# Patient Record
Sex: FEMALE | Race: WHITE | Employment: FULL TIME | URBAN - METROPOLITAN AREA
[De-identification: names, ages, dates, MRNs, and addresses within clinical notes are randomized per-mention and may not be internally consistent; named-entity substitution may affect disease eponyms.]

---

## 2017-01-18 ENCOUNTER — THERAPY VISIT (OUTPATIENT)
Dept: PHYSICAL THERAPY | Facility: CLINIC | Age: 57
End: 2017-01-18
Payer: COMMERCIAL

## 2017-01-18 DIAGNOSIS — Z12.31 VISIT FOR SCREENING MAMMOGRAM: ICD-10-CM

## 2017-01-18 DIAGNOSIS — M54.2 CERVICALGIA: ICD-10-CM

## 2017-01-18 DIAGNOSIS — M25.512 LEFT SHOULDER PAIN: Primary | ICD-10-CM

## 2017-01-18 PROCEDURE — G0202 SCR MAMMO BI INCL CAD: HCPCS

## 2017-01-18 PROCEDURE — 97161 PT EVAL LOW COMPLEX 20 MIN: CPT | Mod: GP | Performed by: PHYSICAL THERAPIST

## 2017-01-18 PROCEDURE — 97110 THERAPEUTIC EXERCISES: CPT | Mod: GP | Performed by: PHYSICAL THERAPIST

## 2017-01-18 NOTE — PROGRESS NOTES
Subjective:    Camelia Alvarez is a 56 year old female with a left shoulder condition.        Patient relates that she is feeling L shoulder shooting pain with use of the L UE.  Notes that her symptoms have been present since March/April 2016, without apparent reason for onset.  Notes that her symptoms have been better with seeing a chiropractor who has taught her how to work on the trigger points of the L posterior shoulder..    Patient reports pain:  Upper arm (anterior and posterior arm when symptoms are present.).  Radiates to: no radiation.  Pain is described as sharp, shooting and aching and is intermittent   Associated symptoms:  Loss of motion/stiffness, loss of strength and painful arc. Pain is worse during the day.  Symptoms are exacerbated by carrying, using arm behind back, lifting, lying on extremity, using arm overhead and using arm at shoulder level (patient is a L side sleepier and sleeping is better since seeing the chiropractor.  Ache in the morning after sleeping on the shoulder.) and relieved by rest.  Since onset symptoms are unchanged.  Special testing: none.  Previous treatment: for the R shoulder and neck last year.    General health as reported by patient is good.  Pertinent medical history includes:  High blood pressure (high cholesterol).  Medical allergies: yes (Penicillins).  Surgical history: appendix.  Medication history: for HTN and cholesterol.  Current occupation is   .  Patient is working in normal job without restrictions.  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    Barriers include:  None as reported by the patient.    Red flags:  None as reported by the patient.                      Objective:    System              Cervical/Thoracic Evaluation  Arom wnl cervical: Rot R 70, L 72, SB R 21, L 21 deg, flexion WNL, Ext 60 deg.                                  Shoulder Evaluation:  ROM:  AROM:    Flexion:  Left:  Limited and  painful    Right:  Full  Extension: Left: 55 Right: full  Abduction:  Left: end range loss   Right:  Full      External Rotation:  Left:  35    Right:  80          Flexion/External Rotation:  Left:  T2    Right:  T3  Extension/Internal Rotation:  Left:  Mid sacraum    Right:  T4      Pain: pain limits shoulder motion both actively and passively    Strength:    Flexion: Left:4+/5    Pain: -    Right: 3+/5      Pain:  -/+    Abduction:  Left: 3+/5   Pain:++    Right: 4+/5      Pain:-    Internal Rotation:  Left:4+/5      Pain:+    Right: 5/5      Pain:-  External Rotation:   Left:3/5      Pain:++   Right:4+/5      Pain:-        Elbow Flexion:  Left:5/5      Pain:-/+    Right:5/5      Pain:-  Elbow Extension:  Left:5/5      Pain:-    Right:5/5      Pain:-                                           Edie Cervical Evaluation        Test Movements:        RET EXT: During: no effect  After: no effect    Repeat RET EXT: During: no effect  After: no effect  Mechanical Response: IncROM                                                                   ROS    Assessment/Plan:      Patient is a 56 year old female with left side shoulder complaints.    Patient has the following significant findings with corresponding treatment plan.                Diagnosis 1:  L shoulder pain    Pain -  manual therapy, self management, education, directional preference exercise, home program - for neck and shoulder  Decreased ROM/flexibility - manual therapy, therapeutic exercise and home program  Decreased joint mobility - manual therapy, therapeutic exercise and home program  Decreased strength - therapeutic exercise, therapeutic activities and home program  Impaired muscle performance - neuro re-education and home program  Decreased function - therapeutic activities and home program  Impaired posture - neuro re-education, therapeutic activities and home program    Therapy Evaluation Codes:   1) History comprised of:   Personal factors that  impact the plan of care:      None.    Comorbidity factors that impact the plan of care are:      None.     Medications impacting care: None.  2) Examination of Body Systems comprised of:   Body structures and functions that impact the plan of care:      Cervical spine and Shoulder.   Activity limitations that impact the plan of care are:      Bathing, Dressing and Lifting.  3) Clinical presentation characteristics are:   Stable/Uncomplicated.  4) Decision-Making    Low complexity using standardized patient assessment instrument and/or measureable assessment of functional outcome.  Cumulative Therapy Evaluation is: Low complexity.    Previous and current functional limitations:  (See Goal Flow Sheet for this information)    Short term and Long term goals: (See Goal Flow Sheet for this information)     Communication ability:  Patient appears to be able to clearly communicate and understand verbal and written communication and follow directions correctly.  Treatment Explanation - The following has been discussed with the patient:   RX ordered/plan of care  Anticipated outcomes  Possible risks and side effects  This patient would benefit from PT intervention to resume normal activities.   Rehab potential is good.  Patient had improved pain with movement and improved shoulder ROM after neck extension exercises.  Pain was produced with shoulder ROM, not worse afterwards.   Possible cervical derangement with shoulder tightness (dysfunction    Frequency:  1 X week, once daily  Duration:  for 6 weeks  Discharge Plan:  Achieve all LTG.  Independent in home treatment program.  Reach maximal therapeutic benefit.    Please refer to the daily flowsheet for treatment today, total treatment time and time spent performing 1:1 timed codes.

## 2017-02-01 ENCOUNTER — THERAPY VISIT (OUTPATIENT)
Dept: PHYSICAL THERAPY | Facility: CLINIC | Age: 57
End: 2017-02-01
Payer: COMMERCIAL

## 2017-02-01 DIAGNOSIS — M25.512 ACUTE PAIN OF LEFT SHOULDER: Primary | ICD-10-CM

## 2017-02-01 DIAGNOSIS — M54.2 CERVICALGIA: ICD-10-CM

## 2017-02-01 PROCEDURE — 97110 THERAPEUTIC EXERCISES: CPT | Mod: GP | Performed by: PHYSICAL THERAPIST

## 2017-05-24 ENCOUNTER — OFFICE VISIT (OUTPATIENT)
Dept: PEDIATRICS | Facility: CLINIC | Age: 57
End: 2017-05-24
Payer: COMMERCIAL

## 2017-05-24 VITALS
DIASTOLIC BLOOD PRESSURE: 70 MMHG | TEMPERATURE: 97.5 F | HEART RATE: 56 BPM | OXYGEN SATURATION: 100 % | WEIGHT: 185.4 LBS | BODY MASS INDEX: 25.68 KG/M2 | SYSTOLIC BLOOD PRESSURE: 126 MMHG

## 2017-05-24 DIAGNOSIS — M26.609 TMJ (TEMPOROMANDIBULAR JOINT SYNDROME): Primary | ICD-10-CM

## 2017-05-24 DIAGNOSIS — R68.84 JAW PAIN: ICD-10-CM

## 2017-05-24 PROCEDURE — 99213 OFFICE O/P EST LOW 20 MIN: CPT | Performed by: FAMILY MEDICINE

## 2017-05-24 RX ORDER — CYCLOBENZAPRINE HCL 5 MG
5-10 TABLET ORAL
Qty: 60 TABLET | Refills: 0 | Status: SHIPPED | OUTPATIENT
Start: 2017-05-24 | End: 2018-01-03

## 2017-05-24 ASSESSMENT — PAIN SCALES - GENERAL: PAINLEVEL: MODERATE PAIN (4)

## 2017-05-24 NOTE — MR AVS SNAPSHOT
After Visit Summary   5/24/2017    Camelia Alvarez    MRN: [id number]           Patient Information     Date Of Birth          1960        Visit Information        Provider Department      5/24/2017 4:40 PM Marta Calvin MD [facility]        Today's Diagnoses     TMJ (temporomandibular joint syndrome)    -  1    Jaw pain, right          Care Instructions      TMJ Syndrome  The temporomandibular joint (TMJ) is the joint that connects your lower jaw to your head. You can feel it in front of your ears when you open and close your mouth. TMJ disorders involve chronic or recurrent pain in the joint. When treated, symptoms of TMJ disorders usually go away within a few months.  Causes  There is no widely agreed-on cause of TMJ disorders. They have been linked to injury, arthritis, chronic fatigue syndrome, and fibromyalgia. A definite connection has not been shown, though.  Symptoms    Pain in the face, jaw, or neck    Pain with jaw movement or chewing    Locking or catching sensation of the jaw    Clicking, popping, or grinding sounds with movement of the TMJ    Headache    Ear pain  Home care  Modest, nonsurgical treatments are a good first step toward relieving symptoms. Try the approaches described below.    Rest the jaw by avoiding crunchy or hard-to-chew foods. Do not eat hard or sticky candies. Soft foods and liquids are easier on the jaw.    Protect your jaw while yawning. If you need to yawn, put your fist under your chin to prevent your mouth from opening up too wide.    To help relieve pain, try applying hot or cold packs to the painful area. Try both hot and cold to find out which works best for you. If you use hot packs (small towels soaked in hot water), be careful not to burn yourself.    You may take acetaminophen or ibuprofen for pain, unless you were given a different pain medicine. (Note: If you have chronic liver or kidney disease or have ever had a stomach  ulcer or gastrointestinal bleeding, talk with your healthcare provider before using these medicines. Also talk to your provider if you are taking medicine to prevent blood clots.) Aspirin should never be given to anyone younger than 18 years of age who is ill with a viral infection or fever. It may cause severe liver or brain damage.  Reducing stress  If stress seems to be contributing to your symptoms, try to identify the sources of stress in your life. These aren t always obvious. Common stressors include:    Everyday hassles (which can add up), such as traffic jams, missed appointments, or car trouble    Major life changes, both good (such as a new baby or job promotion) and bad (loss of job or loss of a loved one)    Overload: The feeling that you have too many responsibilities and can't take care of everything at once    Helplessness: Feeling like your problems are more than you can solve  When possible, do something about your sources of stress. See if you can avoid hassles, limit the amount of change in your life at one time, and take breaks when you feel overloaded.  Unfortunately, many stressful situations cannot be avoided. So learning how to manage stress better is very important. Getting regular exercise, eating nutritious, balanced meals, and getting adequate rest all help to make everyday stress more manageable. Certain techniques are also helpful: relaxation and breathing exercises, visualization, biofeedback, meditation, or simply taking some time out to clear your mind. For more information, talk with your healthcare provider.  Follow-up care  Follow up with your healthcare provider, or as advised. Further testing and additional treatment may be required. If changes to your lifestyle do not improve your symptoms, talk with your healthcare provider about other available therapies. These include bite guards for help with teeth grinding, stress management techniques, and more. If stress is an important  factor and does not respond to the above simple measures, talk to your doctor about a referral for stress management.    If X-rays were done, they will be reviewed by a specialist. You will be notified of the results, especially if they affect treatment.  Call 911  Call emergency services right away if any of these occur:    Trouble breathing or swallowing, wheezing    Confusion    Extreme drowsiness or trouble awakening    Fainting or loss of consciousness    Rapid heart rate  When to seek medical advice  Call your healthcare provider right away if any of these occur:    Your face becomes swollen or red.    Your pain worsens.    You have increasing neck, mouth, tooth, or throat pain.    You develop a fever of 100.4F (38 C) or higher    3421-8574 [employer]. [address] [place] [address], [place] [address]. All rights reserved. This information is not intended as a substitute for professional medical care. Always follow your healthcare professional's instructions.        Helping Your Temporomandibular Joint (TMJ) Heal  The temporomandibular joint (TMJ) is a ball-and-socket joint located where the upper and lower jaws meet. When the TMJ and related muscles are injured, they need time to heal. Self-care is very important. You can take steps to reduce pressure on the TMJ and speed healing.    Eating with care  Chewing strains the TMJ. When symptoms are bad, you may not be able to chew at all. To get you through times when your symptoms are at their worst, try these tips:    Choose soft foods, like scrambled eggs, oatmeal, yogurt, quiche, tofu, soup, smoothies, pasta, fish, mashed potatoes, milkshakes, bananas, applesauce, gelatin, or ice cream.    Avoid biting into hard foods, like whole apples, carrots, and corn on the cob. Instead, cut foods into bite-sized pieces.    Grind or finely chop meats and other tough foods. Try hamburger meat instead of steak.  Using ice and heat  Your health care provider may  suggest using ice and heat. Ice helps reduce swelling and pain. Heat helps relax muscles, increasing blood flow.    Use a gel pack or ice wrapped in a towel for severe pain. Apply for 10 to 20 minutes. Repeat as needed.    Use moist heat for mild to moderate muscle pain. Apply a moist, warm towel to the muscles for 10 to 20 minutes. Repeat as needed.  Avoiding triggers  Certain activities (called triggers) strain the TMJ, making symptoms worse. The tips below can help you avoid common triggers and limit strain.    Avoid hard or chewy foods, like nuts, pretzels, popcorn, chips, gum, caramel, gummy candies, carrots, whole apples, hard breads, and even ice.    Reschedule routine dental visits, like cleanings, if your jaw aches. If you have severe pain, call your health care provider.    Support your jaw when yawning. When you feel a yawn coming on, put a fist under your jaw. Apply gentle pressure. This helps prevent wide, painful yawns.    Avoid any activity that hurts, like nail biting, yelling, and singing.  Maintaining good posture  Work at improving your posture during the day and when you sleep. Good posture can help your body heal. Try these tips:    Use a headset when on the telephone. Don t cradle the phone with your shoulder.    Keep ergonomics in mind. This includes making sure your workstation fits your body. Support your lower back. Take frequent breaks to stretch and rest. If you use a computer, keep the monitor at eye level.    Keep your head in a neutral position, with your ears in line with your shoulders. Don t slouch or crane your head forward.    Use an orthopaedic pillow to support your head and neck during sleep.    0803-1839 [employer]. [address] [place] [address], [place] [address]. All rights reserved. This information is not intended as a substitute for professional medical care. Always follow your healthcare professional's instructions.        Pain Relief Methods for  Temporomandibular Disorders (TMD)  You have been diagnosed with temporomandibular disorder (TMD). This term describes a group of problems related to the temporomandibular joint (TMJ)and nearby muscles. The TMJ is located where the upper and lower jaws meet. TMD can cause painful and frustrating symptoms. But your health care provider can recommend various pain relief methods as part of your treatment. These may include medicines and certain types of therapy, like massage or gentle exercise.  Using medicines    Medicines may be prescribed to treat TMD. Others may be available over-the-counter. The medicine type and dosage will depend on the problem you have. For your safety, tell your health care provider if you are currently taking any medicines. Also mention any herbs or supplements you are using. Common medicines used to treat TMD include:    Anti-inflammatories and analgesics. These treat pain, inflammation, osteoarthritis, and rheumatoid arthritis. Anti-inflammatories reduce swelling, heat, redness, and pain. They also help restore function. Analgesics reduce pain. Nonsteroidal anti-inflammatories (NSAIDs) relieve inflammation as well as pain.    Muscle relaxants. These treat myofascial pain. This is pain that occurs in the soft tissues or muscles around the TMJ. Muscle relaxants help ease muscle tension. This reduces pressure on the TMJ from tight jaw muscles.    Antidepressants. These can be used to reduce pain or bruxism (teeth grinding). At higher dosages, these medicines are used to treat depression. Given at low dosages, antidepressants help relieve TMD symptoms. They can reduce muscle pain. They also raise the level of serotonin, a body chemical that improves sleep. This in turn can decrease bruxism during the night.  Treating painful muscles  A trigger point is a painful spot in a tight muscle. It is often painful to the touch and may refer pain to other places. Your health care provider can focus on  trigger points using:    Massage, both inside and outside the mouth. This relaxes muscles and improves circulation.    Palpation, which is applying pressure to points of the jaw and face with the fingers.    Cold spray and stretching of the muscles to relax them.    An anesthetic for pain relief. This may be given as an injection by your dentist.  Treating the joint  Therapy may focus directly on the TMJ. There are different ways to treat the joint:    A self-care program helps you treat and manage symptoms on your own. Your program may include exercises. It may also include using ice and heat to relieve pain.    Gentle manipulation reduces pain and restores range of motion. The health care provider uses his or her hands to relax muscles and ligaments around the joint.    Exercises strengthen muscles in the jaw and face.    Ultrasound uses sound waves to reduce pain and swelling. It also improves pain and swelling.  Treating inflammation  When the joint is inflamed, movement becomes difficult -- even impossible at times. Your health care provider can help. Treatment may include:    Rest and gentle exercise to increase range of motion. One common exercise is to apply pressure to the jaw and resist the movement (isometric exercise).    A gel pack or ice wrapped in a towel applied for 10 to 20 minutes repeated 3 or 4 times a day. This eases swelling and reduces pain.    Massage and gentle manipulation as described above.    6371-0897 [employer]. [address] [place] [address], [place] [address]. All rights reserved. This information is not intended as a substitute for professional medical care. Always follow your healthcare professional's instructions.    Take FLEXERIL 5-10 mg at night as needed for right jaw pain            Follow-ups after your visit        Your next 10 appointments already scheduled     Jun 21, 2017 10:30 AM CDT   New Visit with Fatoumata Bloom LP   [facility]    [address] [address] [phone]            Jul 05, 2017 11:30 AM CDT   Return Visit with Fatoumata Bloom LP   [facility] [place] [facility] [place])    [address] [address] [phone]              Who to contact     If you have questions or need follow up information about today's clinic visit or your schedule please contact [facility] directly at [phone].  Normal or non-critical lab and imaging results will be communicated to you by [employer], letter or phone within 4 business days after the clinic has received the results. If you do not hear from us within 7 days, please contact the clinic through [employer] or phone. If you have a critical or abnormal lab result, we will notify you by phone as soon as possible.  Submit refill requests through [employer] or call your pharmacy and they will forward the refill request to us. Please allow 3 business days for your refill to be completed.          Additional Information About Your Visit        MyChart Information     [employer] gives you secure access to your electronic health record. If you see a primary care provider, you can also send messages to your care team and make appointments. If you have questions, please call your primary care clinic.  If you do not have a primary care provider, please call [phone] and they will assist you.      [employer] is an electronic gateway that provides easy, online access to your medical records. With [employer], you can request a clinic appointment, read your test results, renew a prescription or communicate with your care team.     To access your existing account, please contact your [facility] or call [phone] for assistance.        Care EveryWhere ID     This is your Care EveryWhere ID. This could be used by other organizations to access your [facility] medical records  [id number]         Your Vitals Were     Pulse Temperature Last Period Pulse Oximetry BMI (Body Mass Index)       56 97.5  F (36.4  C) (Oral) 09/01/2014 (Approximate) 100% 25.68 kg/m2        Blood Pressure from Last 3 Encounters:   05/24/17 126/70   11/18/16 128/76   03/15/16 122/68    Weight from Last 3 Encounters:   05/24/17 185 lb 6.4 oz (84.1 kg)   11/18/16 187 lb 8 oz (85 kg)   02/22/16 191 lb 6.4 oz (86.8 kg)              Today, you had the following     No orders found for display         Today's Medication Changes          These changes are accurate as of: 5/24/17  4:51 PM.  If you have any questions, ask your nurse or doctor.               Start taking these medicines.        Dose/Directions    cyclobenzaprine 5 MG tablet   Commonly known as:  FLEXERIL   Used for:  TMJ (temporomandibular joint syndrome), Jaw pain   Started by:  Marta Calvin MD        Dose:  5-10 mg   Take 1-2 tablets (5-10 mg) by mouth nightly as needed for muscle spasms   Quantity:  60 tablet   Refills:  0            Where to get your medicines      These medications were sent to [facility] - [place], [place] - [address], [address]  [address], [address], [facility] [address]     Phone:  [phone]     cyclobenzaprine 5 MG tablet                Primary Care Provider Office Phone # Fax #    Marta Calvin [phone]       [facility] [address]  [facility] [address]        Thank you!     Thank you for choosing [facility]  for your care. Our goal is always to provide you with excellent care. Hearing back from our patients is one way we can continue to improve our services. Please take a few minutes to complete the written survey that you may receive in the mail after your visit with us. Thank you!             Your Updated Medication List - Protect others around you: Learn how to safely use, store and throw away your medicines at www.disposemymeds.org.           This list is accurate as of: 5/24/17  4:51 PM.  Always use your most recent med list.                   Brand Name Dispense Instructions for use    cyclobenzaprine 5 MG tablet    FLEXERIL    60 tablet    Take 1-2 tablets (5-10 mg) by mouth nightly as needed for muscle spasms       DiphenhydrAMINE HCl (Sleep) 25 MG Caps      Take 1-2 tablets by mouth nightly as needed       fluticasone 50 MCG/ACT spray    FLONASE    16 g    Spray 2 sprays into both nostrils daily       lisinopril-hydrochlorothiazide 10-12.5 MG per tablet    PRINZIDE/ZESTORETIC    90 tablet    Take 1 tablet by mouth daily       MULTIVITAMIN TABS   OR     0    1 tablet daily       OMEGA-3 FISH OIL PO      Take 1 capsule by mouth daily       omeprazole 20 MG CR capsule    priLOSEC    90 capsule    TAKE 1 CAPSULE (20 MG) BY MOUTH  30-60 MINUTES BEFORE A MEAL.       simvastatin 10 MG tablet    ZOCOR    90 tablet    Take 1 tablet (10 mg) by mouth At Bedtime       tolterodine 4 MG 24 hr capsule    DETROL LA    30 capsule    Take 1 capsule (4 mg) by mouth daily       VITAMIN D-3 PO      Take 1 tablet by mouth daily Patient unsure of dose

## 2017-05-24 NOTE — PROGRESS NOTES
SUBJECTIVE:                                                    Camelia Alvarez is a 57 year old female who presents to clinic today for the following health issues:      Jaw Pain  Patient with past medical history significant for hypertension, hyperlipidemia is here with concerns of having progressively worsening pain in the right jaw associated with feeling stiff muscles in the right ear for the past 9 months, worse in the past 2 weeks.   Patient states symptoms have started after she had a crown placement at the dentist office    Around that time   She had a history of TMJ in the past  Patient denies use of at night, denies deep grinding at night  Patient noted pain radiating to the  Right temple and right ear when it gets worse  Has history of seasonal allergies and is not sure if she has sinus infection      Duration: 9 months, worsened and constant x1.5 weeks    Description (location/character/radiation): Right jaw    Intensity:  moderate, severe    Accompanying signs and symptoms: radiating into right ear and headache    History (similar episodes/previous evaluation): Onset 9 months ago with crown placement at dentist. Was intermittent until 1.5 weeks ago became worsened and constant. Patient notes history of TMJ about 20 years ago    Precipitating or alleviating factors: None    Therapies tried and outcome: Ibuprofen 800mg - takes the edge off           Problem list and histories reviewed & adjusted, as indicated.  Additional history: as documented    Patient Active Problem List   Diagnosis     Allergic rhinitis due to dust     Hx gestational diabetes     Hypertension goal BP (blood pressure) < 140/90     Hyperlipidemia LDL goal <160     ASCUS favor benign     Nail dystrophy-right toe     Urge incontinence of urine     Left shoulder pain     Cervicalgia     Past Surgical History:   Procedure Laterality Date     C APPENDECTOMY  1999     COLONOSCOPY  2011    NL, repeat in 10 years     COLONOSCOPY  03/2015     [facility] gastro     HC CURETTAGE, POSTPARTUM       TONSILLECTOMY         Social History   Substance Use Topics     Smoking status: Never Smoker     Smokeless tobacco: Never Used     Alcohol use No     Family History   Problem Relation Age of Onset     DIABETES Paternal Grandmother      type 2     Hypertension Mother      Hypertension Father      OSTEOPOROSIS Maternal Grandmother      Arthritis Maternal Grandmother      MIKEAKHALIDA. Father       of MI at age 81         Current Outpatient Prescriptions   Medication Sig Dispense Refill     cyclobenzaprine (FLEXERIL) 5 MG tablet Take 1-2 tablets (5-10 mg) by mouth nightly as needed for muscle spasms 60 tablet 0     fluticasone (FLONASE) 50 MCG/ACT nasal spray Spray 2 sprays into both nostrils daily 16 g 2     lisinopril-hydrochlorothiazide (PRINZIDE,ZESTORETIC) 10-12.5 MG per tablet Take 1 tablet by mouth daily 90 tablet 1     simvastatin (ZOCOR) 10 MG tablet Take 1 tablet (10 mg) by mouth At Bedtime 90 tablet 3     DiphenhydrAMINE HCl, Sleep, 25 MG CAPS Take 1-2 tablets by mouth nightly as needed        MULTIVITAMIN TABS   OR 1 tablet daily 0 0     tolterodine (DETROL LA) 4 MG 24 hr capsule Take 1 capsule (4 mg) by mouth daily (Patient not taking: Reported on 2017) 30 capsule 1     omeprazole (PRILOSEC) 20 MG capsule TAKE 1 CAPSULE (20 MG) BY MOUTH  30-60 MINUTES BEFORE A MEAL. (Patient not taking: Reported on 2017) 90 capsule 3     Cholecalciferol (VITAMIN D-3 PO) Take 1 tablet by mouth daily Patient unsure of dose       Omega-3 Fatty Acids (OMEGA-3 FISH OIL PO) Take 1 capsule by mouth daily        Allergies   Allergen Reactions     Penicillins Other (See Comments)     Reaction as a child-unsure of type of reaction     Recent Labs   Lab Test  16   0754  11/13/15   0821  03/06/15   1533  14   0937   LDL  95  79   --   104   HDL  54  67   --   60   TRIG  102  91   --   97   ALT  42  36   --   37   CR  0.86  0.86   --   0.91   GFRESTIMATED  69  68   --    64   GFRESTBLACK  83  83   --   78   POTASSIUM  3.9  4.3   --   3.5   TSH  2.02   --   1.12   --       BP Readings from Last 3 Encounters:   05/24/17 126/70   11/18/16 128/76   03/15/16 122/68    Wt Readings from Last 3 Encounters:   05/24/17 185 lb 6.4 oz (84.1 kg)   11/18/16 187 lb 8 oz (85 kg)   02/22/16 191 lb 6.4 oz (86.8 kg)                  Labs reviewed in EPIC    Reviewed and updated as needed this visit by clinical staff  Tobacco  Allergies  Meds  Med Hx  Surg Hx  Fam Hx  Soc Hx      Reviewed and updated as needed this visit by Provider         ROS:  C: NEGATIVE for fever, chills, change in weight  INTEGUMENTARY/SKIN: NEGATIVE for worrisome rashes, moles or lesions  ENT/MOUTH: as above  R: NEGATIVE for significant cough or SOB  CV: NEGATIVE for chest pain, palpitations or peripheral edema  CV: HTN  MUSCULOSKELETAL: as above  PSYCHIATRIC: NEGATIVE for changes in mood or affect    OBJECTIVE:                                                    /70  Pulse 56  Temp 97.5  F (36.4  C) (Oral)  Wt 185 lb 6.4 oz (84.1 kg)  LMP 09/01/2014 (Approximate)  SpO2 100%  BMI 25.68 kg/m2  Body mass index is 25.68 kg/(m^2).  GENERAL: healthy, alert and no distress  EYES: Eyes grossly normal to inspection  HENT: ear canals and TM's normal, nose and mouth without ulcers or lesions  NECK: no adenopathy, no asymmetry, masses, or scars and thyroid normal to palpation  RESP: lungs clear to auscultation - no rales, rhonchi or wheezes  CV: regular rate and rhythm, normal S1 S2, no S3 or S4, no murmur, click or rub, no peripheral edema and peripheral pulses strong  MS: Moderate tenderness over the right TMJ, minimal audible click on the right TMJ, mouth opening-normal but with pain  SKIN: no suspicious lesions or rashes  PSYCH: mentation appears normal, affect normal/bright    Diagnostic Test Results:  none      ASSESSMENT/PLAN:                                                            1. TMJ (temporomandibular joint  syndrome)  Likely causing his current symptoms   patient handouts given. Recommended warm packs to TMJ areas, avoid chewing, biting hard foods, grinding teeth. Encouraged to use mouth guards .   Will consider referral to TMJ clinic for persistent or for worsening concerns.  Per patient's report, her dentist took her right jaw x-ray which showed Some changes and ultimately patient was referred to orthodontist office  Dosing and potential medication side effects discussed.      - cyclobenzaprine (FLEXERIL) 5 MG tablet; Take 1-2 tablets (5-10 mg) by mouth nightly as needed for muscle spasms  Dispense: 60 tablet; Refill: 0    2. Jaw pain, right  ddx-From recent dental work/TMJ  - cyclobenzaprine (FLEXERIL) 5 MG tablet; Take 1-2 tablets (5-10 mg) by mouth nightly as needed for muscle spasms  Dispense: 60 tablet; Refill: 0    Chart documentation done in part with Dragon Voice recognition Software. Although reviewed after completion, some word and grammatical error may remain.    See Patient Instructions    Marta Calvin MD  [facility]

## 2017-05-24 NOTE — PATIENT INSTRUCTIONS
TMJ Syndrome  The temporomandibular joint (TMJ) is the joint that connects your lower jaw to your head. You can feel it in front of your ears when you open and close your mouth. TMJ disorders involve chronic or recurrent pain in the joint. When treated, symptoms of TMJ disorders usually go away within a few months.  Causes  There is no widely agreed-on cause of TMJ disorders. They have been linked to injury, arthritis, chronic fatigue syndrome, and fibromyalgia. A definite connection has not been shown, though.  Symptoms    Pain in the face, jaw, or neck    Pain with jaw movement or chewing    Locking or catching sensation of the jaw    Clicking, popping, or grinding sounds with movement of the TMJ    Headache    Ear pain  Home care  Modest, nonsurgical treatments are a good first step toward relieving symptoms. Try the approaches described below.    Rest the jaw by avoiding crunchy or hard-to-chew foods. Do not eat hard or sticky candies. Soft foods and liquids are easier on the jaw.    Protect your jaw while yawning. If you need to yawn, put your fist under your chin to prevent your mouth from opening up too wide.    To help relieve pain, try applying hot or cold packs to the painful area. Try both hot and cold to find out which works best for you. If you use hot packs (small towels soaked in hot water), be careful not to burn yourself.    You may take acetaminophen or ibuprofen for pain, unless you were given a different pain medicine. (Note: If you have chronic liver or kidney disease or have ever had a stomach ulcer or gastrointestinal bleeding, talk with your healthcare provider before using these medicines. Also talk to your provider if you are taking medicine to prevent blood clots.) Aspirin should never be given to anyone younger than 18 years of age who is ill with a viral infection or fever. It may cause severe liver or brain damage.  Reducing stress  If stress seems to be contributing to your symptoms,  try to identify the sources of stress in your life. These aren t always obvious. Common stressors include:    Everyday hassles (which can add up), such as traffic jams, missed appointments, or car trouble    Major life changes, both good (such as a new baby or job promotion) and bad (loss of job or loss of a loved one)    Overload: The feeling that you have too many responsibilities and can't take care of everything at once    Helplessness: Feeling like your problems are more than you can solve  When possible, do something about your sources of stress. See if you can avoid hassles, limit the amount of change in your life at one time, and take breaks when you feel overloaded.  Unfortunately, many stressful situations cannot be avoided. So learning how to manage stress better is very important. Getting regular exercise, eating nutritious, balanced meals, and getting adequate rest all help to make everyday stress more manageable. Certain techniques are also helpful: relaxation and breathing exercises, visualization, biofeedback, meditation, or simply taking some time out to clear your mind. For more information, talk with your healthcare provider.  Follow-up care  Follow up with your healthcare provider, or as advised. Further testing and additional treatment may be required. If changes to your lifestyle do not improve your symptoms, talk with your healthcare provider about other available therapies. These include bite guards for help with teeth grinding, stress management techniques, and more. If stress is an important factor and does not respond to the above simple measures, talk to your doctor about a referral for stress management.    If X-rays were done, they will be reviewed by a specialist. You will be notified of the results, especially if they affect treatment.  Call 911  Call emergency services right away if any of these occur:    Trouble breathing or swallowing, wheezing    Confusion    Extreme drowsiness or  trouble awakening    Fainting or loss of consciousness    Rapid heart rate  When to seek medical advice  Call your healthcare provider right away if any of these occur:    Your face becomes swollen or red.    Your pain worsens.    You have increasing neck, mouth, tooth, or throat pain.    You develop a fever of 100.4F (38 C) or higher    3398-4729 [employer]. [address] [place] [address]. All rights reserved. This information is not intended as a substitute for professional medical care. Always follow your healthcare professional's instructions.        Helping Your Temporomandibular Joint (TMJ) Heal  The temporomandibular joint (TMJ) is a ball-and-socket joint located where the upper and lower jaws meet. When the TMJ and related muscles are injured, they need time to heal. Self-care is very important. You can take steps to reduce pressure on the TMJ and speed healing.    Eating with care  Chewing strains the TMJ. When symptoms are bad, you may not be able to chew at all. To get you through times when your symptoms are at their worst, try these tips:    Choose soft foods, like scrambled eggs, oatmeal, yogurt, quiche, tofu, soup, smoothies, pasta, fish, mashed potatoes, milkshakes, bananas, applesauce, gelatin, or ice cream.    Avoid biting into hard foods, like whole apples, carrots, and corn on the cob. Instead, cut foods into bite-sized pieces.    Grind or finely chop meats and other tough foods. Try hamburger meat instead of steak.  Using ice and heat  Your health care provider may suggest using ice and heat. Ice helps reduce swelling and pain. Heat helps relax muscles, increasing blood flow.    Use a gel pack or ice wrapped in a towel for severe pain. Apply for 10 to 20 minutes. Repeat as needed.    Use moist heat for mild to moderate muscle pain. Apply a moist, warm towel to the muscles for 10 to 20 minutes. Repeat as needed.  Avoiding triggers  Certain activities (called triggers)  strain the TMJ, making symptoms worse. The tips below can help you avoid common triggers and limit strain.    Avoid hard or chewy foods, like nuts, pretzels, popcorn, chips, gum, caramel, gummy candies, carrots, whole apples, hard breads, and even ice.    Reschedule routine dental visits, like cleanings, if your jaw aches. If you have severe pain, call your health care provider.    Support your jaw when yawning. When you feel a yawn coming on, put a fist under your jaw. Apply gentle pressure. This helps prevent wide, painful yawns.    Avoid any activity that hurts, like nail biting, yelling, and singing.  Maintaining good posture  Work at improving your posture during the day and when you sleep. Good posture can help your body heal. Try these tips:    Use a headset when on the telephone. Don t cradle the phone with your shoulder.    Keep ergonomics in mind. This includes making sure your workstation fits your body. Support your lower back. Take frequent breaks to stretch and rest. If you use a computer, keep the monitor at eye level.    Keep your head in a neutral position, with your ears in line with your shoulders. Don t slouch or crane your head forward.    Use an orthopaedic pillow to support your head and neck during sleep.    4002-8774 [employer]. [address] [place] [address], [place] [address]. All rights reserved. This information is not intended as a substitute for professional medical care. Always follow your healthcare professional's instructions.        Pain Relief Methods for Temporomandibular Disorders (TMD)  You have been diagnosed with temporomandibular disorder (TMD). This term describes a group of problems related to the temporomandibular joint (TMJ)and nearby muscles. The TMJ is located where the upper and lower jaws meet. TMD can cause painful and frustrating symptoms. But your health care provider can recommend various pain relief methods as part of your treatment. These may include  medicines and certain types of therapy, like massage or gentle exercise.  Using medicines    Medicines may be prescribed to treat TMD. Others may be available over-the-counter. The medicine type and dosage will depend on the problem you have. For your safety, tell your health care provider if you are currently taking any medicines. Also mention any herbs or supplements you are using. Common medicines used to treat TMD include:    Anti-inflammatories and analgesics. These treat pain, inflammation, osteoarthritis, and rheumatoid arthritis. Anti-inflammatories reduce swelling, heat, redness, and pain. They also help restore function. Analgesics reduce pain. Nonsteroidal anti-inflammatories (NSAIDs) relieve inflammation as well as pain.    Muscle relaxants. These treat myofascial pain. This is pain that occurs in the soft tissues or muscles around the TMJ. Muscle relaxants help ease muscle tension. This reduces pressure on the TMJ from tight jaw muscles.    Antidepressants. These can be used to reduce pain or bruxism (teeth grinding). At higher dosages, these medicines are used to treat depression. Given at low dosages, antidepressants help relieve TMD symptoms. They can reduce muscle pain. They also raise the level of serotonin, a body chemical that improves sleep. This in turn can decrease bruxism during the night.  Treating painful muscles  A trigger point is a painful spot in a tight muscle. It is often painful to the touch and may refer pain to other places. Your health care provider can focus on trigger points using:    Massage, both inside and outside the mouth. This relaxes muscles and improves circulation.    Palpation, which is applying pressure to points of the jaw and face with the fingers.    Cold spray and stretching of the muscles to relax them.    An anesthetic for pain relief. This may be given as an injection by your dentist.  Treating the joint  Therapy may focus directly on the TMJ. There are  different ways to treat the joint:    A self-care program helps you treat and manage symptoms on your own. Your program may include exercises. It may also include using ice and heat to relieve pain.    Gentle manipulation reduces pain and restores range of motion. The health care provider uses his or her hands to relax muscles and ligaments around the joint.    Exercises strengthen muscles in the jaw and face.    Ultrasound uses sound waves to reduce pain and swelling. It also improves pain and swelling.  Treating inflammation  When the joint is inflamed, movement becomes difficult -- even impossible at times. Your health care provider can help. Treatment may include:    Rest and gentle exercise to increase range of motion. One common exercise is to apply pressure to the jaw and resist the movement (isometric exercise).    A gel pack or ice wrapped in a towel applied for 10 to 20 minutes repeated 3 or 4 times a day. This eases swelling and reduces pain.    Massage and gentle manipulation as described above.    2315-7924 [employer]. [address] [place] [address], [place] [address]. All rights reserved. This information is not intended as a substitute for professional medical care. Always follow your healthcare professional's instructions.    Take FLEXERIL 5-10 mg at night as needed for right jaw pain

## 2017-05-24 NOTE — NURSING NOTE
"Chief Complaint   Patient presents with     Jaw Pain       Initial /70  Pulse 56  Temp 97.5  F (36.4  C) (Oral)  Wt 185 lb 6.4 oz (84.1 kg)  LMP 09/01/2014 (Approximate)  SpO2 100%  BMI 25.68 kg/m2 Estimated body mass index is 25.68 kg/(m^2) as calculated from the following:    Height as of 11/18/16: 5' 11.25\" (1.81 m).    Weight as of this encounter: 185 lb 6.4 oz (84.1 kg).  BP completed using cuff size: rahul Oquendo CMA    "

## 2017-05-30 ENCOUNTER — TELEPHONE (OUTPATIENT)
Dept: PEDIATRICS | Facility: CLINIC | Age: 57
End: 2017-05-30

## 2017-05-30 DIAGNOSIS — R68.84 JAW PAIN: Primary | ICD-10-CM

## 2017-05-30 RX ORDER — HYDROCODONE BITARTRATE AND ACETAMINOPHEN 5; 325 MG/1; MG/1
.5-1 TABLET ORAL 2 TIMES DAILY PRN
Qty: 15 TABLET | Refills: 0 | Status: SHIPPED | OUTPATIENT
Start: 2017-05-30 | End: 2018-01-03
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
## 2017-05-30 NOTE — TELEPHONE ENCOUNTER
Received fax from [facility] stating that patients insurance plan does not cover Rx for traMADol (ULTRAM) 50 MG tablet. Patient needs change of medication or prior authorization.     Insurance: [employer]   Phone# [phone]  ID# [id number]    Will route to Dr. Calvin for review and orders.  Ivone Oquendo, [facility]

## 2017-06-07 NOTE — TELEPHONE ENCOUNTER
Rx for HYDROcodone-acetaminophen (NORCO) 5-325 MG per tablet from 5/30/17 never picked up at desk.     Mailed Rx for [facility] pharmacy at  [address] [facility] [address]    Left message on home number to return call to clinic.  If/when patient returns call can inform Rx mailed to her [facility] pharmacy.  Ivone Oquendo, [facility]

## 2017-06-21 ENCOUNTER — OFFICE VISIT (OUTPATIENT)
Dept: PSYCHOLOGY | Facility: CLINIC | Age: 57
End: 2017-06-21
Payer: COMMERCIAL

## 2017-06-21 DIAGNOSIS — F43.22 ADJUSTMENT DISORDER WITH ANXIETY: Primary | ICD-10-CM

## 2017-06-21 PROCEDURE — 90791 PSYCH DIAGNOSTIC EVALUATION: CPT | Performed by: PSYCHOLOGIST
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
## 2017-06-21 NOTE — MR AVS SNAPSHOT
MRN:[id number]                      After Visit Summary   6/21/2017    Camelia Alvarez    MRN: [id number]           Visit Information        Provider Department      6/21/2017 10:30 AM Fatoumata Bloom LP [facility] Generic      Your next 10 appointments already scheduled     Jul 19, 2017  9:30 AM CDT   Return Visit with Fatoumata GALEANO SAMUEL Bloom   [facility] ([facility])    [address] [place] [id number] [phone]              MyChart Information     [employer] gives you secure access to your electronic health record. If you see a primary care provider, you can also send messages to your care team and make appointments. If you have questions, please call your primary care clinic.  If you do not have a primary care provider, please call [phone] and they will assist you.        Care EveryWhere ID     This is your Care EveryWhere ID. This could be used by other organizations to access your [facility] medical records  [id number]        Equal Access to Services     KIM RAYMOND : Hadii aad ku hadasho Somichealali, waaxda luqadaha, qaybta kaalmada adeegyaziggy, emma funes. So [facility] [phone].    ATENCIÓN: Si habla español, tiene a robertson disposición servicios gratuitos de asistencia lingüística. Llame al [phone].    We comply with applicable federal civil rights laws and [place] laws. We do not discriminate on the basis of race, color, national origin, age, disability sex, sexual orientation or gender identity.

## 2017-07-19 ENCOUNTER — OFFICE VISIT (OUTPATIENT)
Dept: PSYCHOLOGY | Facility: CLINIC | Age: 57
End: 2017-07-19
Payer: COMMERCIAL

## 2017-07-19 DIAGNOSIS — F43.22 ADJUSTMENT DISORDER WITH ANXIETY: Primary | ICD-10-CM

## 2017-07-19 PROCEDURE — 90834 PSYTX W PT 45 MINUTES: CPT | Performed by: PSYCHOLOGIST

## 2017-07-19 NOTE — MR AVS SNAPSHOT
MRN:[id number]                      After Visit Summary   7/19/2017    Camelia Alvarez    MRN: [id number]           Visit Information        Provider Department      7/19/2017 9:30 AM Fatoumata Bloom LP [facility] Generic      Your next 10 appointments already scheduled     Aug 16, 2017  9:30 AM CDT   Return Visit with Fatoumata GALEANO SAMUEL Bloom   [facility] ([facility])    [address] [place] [id number] [phone]              MyChart Information     [employer] gives you secure access to your electronic health record. If you see a primary care provider, you can also send messages to your care team and make appointments. If you have questions, please call your primary care clinic.  If you do not have a primary care provider, please call [phone] and they will assist you.        Care EveryWhere ID     This is your Care EveryWhere ID. This could be used by other organizations to access your [facility] medical records  [id number]        Equal Access to Services     KIM RAYMOND : Hadii aad ku hadasho Somichaelali, waaxda luqadaha, qaybta kaalmada adeegyaziggy, emma funes. So [facility] [phone].    ATENCIÓN: Si habla español, tiene a robertson disposición servicios gratuitos de asistencia lingüística. Llame al [phone].    We comply with applicable federal civil rights laws and [place] laws. We do not discriminate on the basis of race, color, national origin, age, disability sex, sexual orientation or gender identity.

## 2017-07-19 NOTE — PROGRESS NOTES
Progress Note    Client Name: Camelia Alvarez  Date: 7/19/2017       Service Type: Individual      Session Start Time: 9:30  Session End Time: 10:20      Session Length:  50 min     Session #: 2     Attendees: Client attended alone    Treatment Plan Last Reviewed: today  PHQ-9 / EUGENE-7: next session     DATA      Progress Since Last Session (Related to Symptoms / Goals / Homework):   Symptoms: Improved    Homework: Partially completed      Episode of Care Goals: Satisfactory progress - ACTION (Actively working towards change); Intervened by reinforcing change plan / affirming steps taken     Current / Ongoing Stressors and Concerns:    is Narcissistic and controlling   Wants to lose weight     Treatment Objective(s) Addressed in This Session:   identify 3 strategies to more effectively address stressors  Increase interest, engagement, and pleasure in doing things  Decrease frequency and intensity of feeling down, depressed, hopeless  Improve concentration, focus, and mindfulness in daily activities   Emotional boundaries and self care     Intervention:   CBT: to not take personally Eliud's opinions  Interpersonal Therapy: Yes, And Communication and boundries  Motivational Interviewing: to lose weight for her value of turning heads.   Recommended support outside of marriage for needs.     ASSESSMENT: Current Emotional / Mental Status (status of significant symptoms):   Risk status (Self / Other harm or suicidal ideation)  Client denies a history of suicidal ideation, suicide attempts, self-injurious behavior, homicidal ideation, homicidal behavior and and other safety concerns   Client denies current fears or concerns for personal safety.   Client denies current or recent suicidal ideation or behaviors.   Client denies current or recent homicidal ideation or behaviors.   Client denies current or recent self injurious behavior or ideation.   Client denies other safety  concerns.   A safety and risk management plan has not been developed at this time, however client was given the after-hours number / 911 should there be a change in any of these risk factors.     Appearance:   Appropriate    Eye Contact:   Fair    Psychomotor Behavior: Normal  Retarded (Slowed)  slumped shoulders    Attitude:   Cooperative  Guarded    Orientation:   All   Speech    Rate / Production: Normal  Slow     Volume:  Soft    Mood:    Anxious  Normal Sad    Affect:    Appropriate  Constricted    Thought Content:  Clear    Thought Form:  Coherent  Logical    Insight:    Good      Medication Review:   No current psychiatric medications prescribed     Medication Compliance:   NA     Changes in Health Issues:   Yes: Weight / dietary issues, Associated Psychological Distress  hypertention, Associated Psychological Distress     Chemical Use Review:   Substance Use: Chemical use reviewed, no active concerns identified      Tobacco Use: No current tobacco use.       Collateral Reports Completed:   Not Applicable    PLAN: (Client Tasks / Therapist Tasks / Other)  Use Yes, and communication. Seek support outside of marriage for self worth and validation. Prioritize her value of turning heads to regulate her eating. Session in a month to check in. Next session last?      Fatoumata Bloom,                                                          ________________________________________________________________________    Treatment Plan    Client's Name: Camelia Alvarez  YOB: 1960    Date: 7/19/2017    DSM-V Diagnoses: Adjustment Disorders  309.24 (F43.22) With anxiety  Psychosocial & Contextual Factors: spouse is controlling and maybe Narcissistic Personality Disorder.  WHODAS: 14 at intake    Referral / Collaboration:  Referral to another professional/service is not indicated at this time..    Anticipated number of session or this episode of care: 4-5      MeasurableTreatment Goal(s) related to diagnosis  / functional impairment(s)  Goal 1: Client will she has established outside support for her controlling marriage    I will know I've met my goal when I have confirmation of what I am doing is correct.      Objective #A (Client Action)    Client will identify 2 strategies to more effectively address stressors  use cognitive strategies identified in therapy to challenge anxious thoughts.  Status: New - Date: 7/19/17     Intervention(s)  Therapist will role-play effective communication skills  teach about healthy boundaries. And calming techniques.    Objective #B  Client will participate in 2 activities to improve mood  initiate 2 social contact(s) per day for increasing lengths of time.  Status: New - Date: 7/19/17     Intervention(s)  Therapist will assign homework to develop and social support system  provide emotional validation.     Client has reviewed and agreed to the above plan.      Fatoumata Bloom, SAMUEL  July 19, 2017

## 2017-07-25 DIAGNOSIS — I10 HYPERTENSION GOAL BP (BLOOD PRESSURE) < 140/90: ICD-10-CM

## 2017-07-25 RX ORDER — LISINOPRIL/HYDROCHLOROTHIAZIDE 10-12.5 MG
TABLET ORAL
Qty: 90 TABLET | Refills: 0 | Status: SHIPPED | OUTPATIENT
Start: 2017-07-25 | End: 2017-11-19

## 2017-07-25 NOTE — TELEPHONE ENCOUNTER
lisinopril-hydrochlorothiazide (PRINZIDE/ZESTORETIC) 10-12.5 MG per tablet   Last Written Prescription Date: 11/18/2016  Last Fill Quantity: 90, # refills: 1  Last Office Visit with G, [facility] or [facility] prescribing provider: 5/24/2017  Next 5 appointments (look out 90 days)     Aug 16, 2017  9:30 AM CDT   Return Visit with Fatoumata Bloom LP   [facility] ([facility])    [address] [place] [id number] [phone]                   Potassium   Date Value Ref Range Status   11/18/2016 3.9 3.4 - 5.3 mmol/L Final     Creatinine   Date Value Ref Range Status   11/18/2016 0.86 0.52 - 1.04 mg/dL Final     BP Readings from Last 3 Encounters:   05/24/17 126/70   11/18/16 128/76   03/15/16 122/68     Refilled per [facility] protocol.    Rhonda Sainz RN

## 2017-08-13 ENCOUNTER — MYC MEDICAL ADVICE (OUTPATIENT)
Dept: PEDIATRICS | Facility: CLINIC | Age: 57
End: 2017-08-13

## 2017-08-13 DIAGNOSIS — M25.511 PAIN IN JOINT OF RIGHT SHOULDER: ICD-10-CM

## 2017-08-13 DIAGNOSIS — M25.511 TRIGGER POINT OF RIGHT SHOULDER REGION: ICD-10-CM

## 2017-08-13 DIAGNOSIS — M54.12 CERVICAL RADICULAR PAIN: ICD-10-CM

## 2017-08-13 DIAGNOSIS — M26.621 ARTHRALGIA OF RIGHT TEMPOROMANDIBULAR JOINT: ICD-10-CM

## 2017-08-14 RX ORDER — TRAMADOL HYDROCHLORIDE 50 MG/1
50 TABLET ORAL EVERY 6 HOURS PRN
Qty: 15 TABLET | Refills: 0 | Status: SHIPPED | OUTPATIENT
Start: 2017-08-14 | End: 2018-01-03

## 2017-08-14 RX ORDER — DICLOFENAC SODIUM 75 MG/1
75 TABLET, DELAYED RELEASE ORAL 2 TIMES DAILY PRN
Qty: 30 TABLET | Refills: 0 | Status: SHIPPED | OUTPATIENT
Start: 2017-08-14 | End: 2018-01-03

## 2017-10-08 DIAGNOSIS — K21.9 GASTROESOPHAGEAL REFLUX DISEASE WITHOUT ESOPHAGITIS: ICD-10-CM

## 2017-10-09 NOTE — TELEPHONE ENCOUNTER
omeprazole (PRILOSEC) 20 MG CR capsule    Last Written Prescription Date: 11/13/2016  Last Fill Quantity: 90,  # refills: 3   Last Office Visit with G, P or [facility] prescribing provider: 5/24/2017    Refilled per [facility] protocol.    Rhonda Sainz RN

## 2017-11-19 DIAGNOSIS — I10 HYPERTENSION GOAL BP (BLOOD PRESSURE) < 140/90: ICD-10-CM

## 2017-11-19 NOTE — LETTER
November 21, 2017      Camelia Alvarez  [address]  [facility] [place] [facility] [address]              Dear Camelia,      We recently received a call from your pharmacy requesting a refill of your medication. We have provided a 30 day refill of your medication, lisinopril-hydrochlorothiazide (PRINZIDE/ZESTORETIC) 10-12.5 MG per tablet, as requested.      A review of your chart indicates that your last laboratory monitoring for the above medication was on 11/15/2016.  Laboratory monitoring is required yearly with your provider.       We have authorized one time 30 day refill of your medication to allow time for you to schedule.     If you have a history of diabetes or high cholesterol, please come in fasting for the appointment. Fasting entails nothing to eat or drink 8 hours prior to your appointment; with the exception on water. You may take your medication the day of the appointment.    Please call the clinic to schedule your appointment.    Thank you for taking an active role in your healthcare.      Sincerely,    [facility]    Marta Calvin MD

## 2017-11-21 RX ORDER — LISINOPRIL/HYDROCHLOROTHIAZIDE 10-12.5 MG
TABLET ORAL
Qty: 30 TABLET | Refills: 0 | Status: SHIPPED | OUTPATIENT
Start: 2017-11-21 | End: 2017-12-25

## 2017-11-21 NOTE — TELEPHONE ENCOUNTER
lisinopril-hydrochlorothiazide (PRINZIDE/ZESTORETIC) 10-12.5 MG per tablet  Last Written Prescription Date: 7/25/2017  Last Fill Quantity: 90, # refills: 0  Last Office Visit with G, P or [facility] prescribing provider: 5/24/2017       Potassium   Date Value Ref Range Status   11/18/2016 3.9 3.4 - 5.3 mmol/L Final     Creatinine   Date Value Ref Range Status   11/18/2016 0.86 0.52 - 1.04 mg/dL Final     BP Readings from Last 3 Encounters:   05/24/17 126/70   11/18/16 128/76   03/15/16 122/68     Ira refill. Letter sent to patient. Rhonda Sainz RN     never used

## 2017-12-18 DIAGNOSIS — I10 HYPERTENSION GOAL BP (BLOOD PRESSURE) < 140/90: ICD-10-CM

## 2017-12-18 DIAGNOSIS — Z00.00 ROUTINE GENERAL MEDICAL EXAMINATION AT A HEALTH CARE FACILITY: Primary | ICD-10-CM

## 2017-12-18 DIAGNOSIS — Z13.29 SCREENING FOR THYROID DISORDER: ICD-10-CM

## 2017-12-18 DIAGNOSIS — Z13.0 SCREENING FOR DEFICIENCY ANEMIA: ICD-10-CM

## 2017-12-18 DIAGNOSIS — E78.5 HYPERLIPIDEMIA LDL GOAL <160: ICD-10-CM

## 2017-12-25 DIAGNOSIS — I10 HYPERTENSION GOAL BP (BLOOD PRESSURE) < 140/90: ICD-10-CM

## 2017-12-26 RX ORDER — LISINOPRIL/HYDROCHLOROTHIAZIDE 10-12.5 MG
TABLET ORAL
Qty: 30 TABLET | Refills: 0 | Status: SHIPPED | OUTPATIENT
Start: 2017-12-26 | End: 2018-01-03

## 2017-12-26 NOTE — TELEPHONE ENCOUNTER
lisinopril-hydrochlorothiazide (PRINZIDE/ZESTORETIC) 10-12.5 MG per tablet    Last Written Prescription Date: 1/21/2017  Last Fill Quantity: 30, # refills: 0  Last Office Visit with [facility], [facility] or [facility] prescribing provider: 5/24/2017  Next 5 appointments (look out 90 days)     Jan 03, 2018  3:30 PM CST   PHYSICAL with Marta Calvin MD   [facility] ([facility])    [address] [place] [id number] [phone]                   Potassium   Date Value Ref Range Status   11/18/2016 3.9 3.4 - 5.3 mmol/L Final     Creatinine   Date Value Ref Range Status   11/18/2016 0.86 0.52 - 1.04 mg/dL Final     BP Readings from Last 3 Encounters:   05/24/17 126/70   11/18/16 128/76   03/15/16 122/68     Refilled per [facility] protocol.    Rhonda Sainz RN

## 2017-12-30 DIAGNOSIS — E78.5 HYPERLIPIDEMIA LDL GOAL <160: ICD-10-CM

## 2018-01-03 ENCOUNTER — OFFICE VISIT (OUTPATIENT)
Dept: PEDIATRICS | Facility: CLINIC | Age: 58
End: 2018-01-03
Payer: COMMERCIAL

## 2018-01-03 ENCOUNTER — RADIANT APPOINTMENT (OUTPATIENT)
Dept: GENERAL RADIOLOGY | Facility: CLINIC | Age: 58
End: 2018-01-03
Attending: FAMILY MEDICINE
Payer: COMMERCIAL

## 2018-01-03 VITALS
SYSTOLIC BLOOD PRESSURE: 126 MMHG | DIASTOLIC BLOOD PRESSURE: 74 MMHG | WEIGHT: 185.5 LBS | OXYGEN SATURATION: 99 % | TEMPERATURE: 97.1 F | BODY MASS INDEX: 25.97 KG/M2 | HEART RATE: 71 BPM | HEIGHT: 71 IN

## 2018-01-03 DIAGNOSIS — Z12.4 SCREENING FOR CERVICAL CANCER: ICD-10-CM

## 2018-01-03 DIAGNOSIS — E78.5 HYPERLIPIDEMIA LDL GOAL <160: ICD-10-CM

## 2018-01-03 DIAGNOSIS — I10 HYPERTENSION GOAL BP (BLOOD PRESSURE) < 140/90: ICD-10-CM

## 2018-01-03 DIAGNOSIS — R20.0 NUMBNESS OF TOES: ICD-10-CM

## 2018-01-03 DIAGNOSIS — Z13.0 SCREENING FOR DEFICIENCY ANEMIA: ICD-10-CM

## 2018-01-03 DIAGNOSIS — E55.9 VITAMIN D DEFICIENCY: ICD-10-CM

## 2018-01-03 DIAGNOSIS — Z13.29 SCREENING FOR THYROID DISORDER: ICD-10-CM

## 2018-01-03 DIAGNOSIS — M79.672 LEFT FOOT PAIN: ICD-10-CM

## 2018-01-03 DIAGNOSIS — Z23 NEED FOR PROPHYLACTIC VACCINATION AND INOCULATION AGAINST INFLUENZA: ICD-10-CM

## 2018-01-03 DIAGNOSIS — Z00.00 ROUTINE GENERAL MEDICAL EXAMINATION AT A HEALTH CARE FACILITY: ICD-10-CM

## 2018-01-03 DIAGNOSIS — Z12.39 BREAST CANCER SCREENING: ICD-10-CM

## 2018-01-03 DIAGNOSIS — Z00.01 ENCOUNTER FOR GENERAL ADULT MEDICAL EXAMINATION WITH ABNORMAL FINDINGS: Primary | ICD-10-CM

## 2018-01-03 LAB
ALBUMIN SERPL-MCNC: 3.8 G/DL (ref 3.4–5)
ALP SERPL-CCNC: 73 U/L (ref 40–150)
ALT SERPL W P-5'-P-CCNC: 31 U/L (ref 0–50)
ANION GAP SERPL CALCULATED.3IONS-SCNC: 6 MMOL/L (ref 3–14)
AST SERPL W P-5'-P-CCNC: 25 U/L (ref 0–45)
BASOPHILS # BLD AUTO: 0 10E9/L (ref 0–0.2)
BASOPHILS NFR BLD AUTO: 0.7 %
BILIRUB SERPL-MCNC: 0.8 MG/DL (ref 0.2–1.3)
BUN SERPL-MCNC: 22 MG/DL (ref 7–30)
CALCIUM SERPL-MCNC: 9 MG/DL (ref 8.5–10.1)
CHLORIDE SERPL-SCNC: 105 MMOL/L (ref 94–109)
CHOLEST SERPL-MCNC: 156 MG/DL
CO2 SERPL-SCNC: 29 MMOL/L (ref 20–32)
CREAT SERPL-MCNC: 0.82 MG/DL (ref 0.52–1.04)
CREAT UR-MCNC: 251 MG/DL
DIFFERENTIAL METHOD BLD: NORMAL
EOSINOPHIL # BLD AUTO: 0.2 10E9/L (ref 0–0.7)
EOSINOPHIL NFR BLD AUTO: 4.2 %
ERYTHROCYTE [DISTWIDTH] IN BLOOD BY AUTOMATED COUNT: 12.2 % (ref 10–15)
GFR SERPL CREATININE-BSD FRML MDRD: 71 ML/MIN/1.7M2
GLUCOSE SERPL-MCNC: 97 MG/DL (ref 70–99)
HCT VFR BLD AUTO: 39.7 % (ref 35–47)
HDLC SERPL-MCNC: 56 MG/DL
HGB BLD-MCNC: 12.8 G/DL (ref 11.7–15.7)
IMM GRANULOCYTES # BLD: 0 10E9/L (ref 0–0.4)
IMM GRANULOCYTES NFR BLD: 0.5 %
LDLC SERPL CALC-MCNC: 84 MG/DL
LYMPHOCYTES # BLD AUTO: 1.5 10E9/L (ref 0.8–5.3)
LYMPHOCYTES NFR BLD AUTO: 26.7 %
MCH RBC QN AUTO: 31 PG (ref 26.5–33)
MCHC RBC AUTO-ENTMCNC: 32.2 G/DL (ref 31.5–36.5)
MCV RBC AUTO: 96 FL (ref 78–100)
MICROALBUMIN UR-MCNC: 14 MG/L
MICROALBUMIN/CREAT UR: 5.58 MG/G CR (ref 0–25)
MONOCYTES # BLD AUTO: 0.4 10E9/L (ref 0–1.3)
MONOCYTES NFR BLD AUTO: 7.6 %
NEUTROPHILS # BLD AUTO: 3.3 10E9/L (ref 1.6–8.3)
NEUTROPHILS NFR BLD AUTO: 60.3 %
NONHDLC SERPL-MCNC: 100 MG/DL
PLATELET # BLD AUTO: 256 10E9/L (ref 150–450)
POTASSIUM SERPL-SCNC: 3.8 MMOL/L (ref 3.4–5.3)
PROT SERPL-MCNC: 7.7 G/DL (ref 6.8–8.8)
RBC # BLD AUTO: 4.13 10E12/L (ref 3.8–5.2)
SODIUM SERPL-SCNC: 140 MMOL/L (ref 133–144)
TRIGL SERPL-MCNC: 79 MG/DL
TSH SERPL DL<=0.005 MIU/L-ACNC: 1.67 MU/L (ref 0.4–4)
WBC # BLD AUTO: 5.5 10E9/L (ref 4–11)

## 2018-01-03 PROCEDURE — 82043 UR ALBUMIN QUANTITATIVE: CPT | Performed by: FAMILY MEDICINE

## 2018-01-03 PROCEDURE — 87624 HPV HI-RISK TYP POOLED RSLT: CPT | Performed by: FAMILY MEDICINE

## 2018-01-03 PROCEDURE — 99213 OFFICE O/P EST LOW 20 MIN: CPT | Mod: 25 | Performed by: FAMILY MEDICINE

## 2018-01-03 PROCEDURE — 80061 LIPID PANEL: CPT | Performed by: FAMILY MEDICINE

## 2018-01-03 PROCEDURE — 80050 GENERAL HEALTH PANEL: CPT | Performed by: FAMILY MEDICINE

## 2018-01-03 PROCEDURE — 99396 PREV VISIT EST AGE 40-64: CPT | Mod: 25 | Performed by: FAMILY MEDICINE

## 2018-01-03 PROCEDURE — 90471 IMMUNIZATION ADMIN: CPT | Performed by: FAMILY MEDICINE

## 2018-01-03 PROCEDURE — 73630 X-RAY EXAM OF FOOT: CPT | Mod: LT | Performed by: RADIOLOGY

## 2018-01-03 PROCEDURE — 36415 COLL VENOUS BLD VENIPUNCTURE: CPT | Performed by: FAMILY MEDICINE

## 2018-01-03 PROCEDURE — G0145 SCR C/V CYTO,THINLAYER,RESCR: HCPCS | Performed by: FAMILY MEDICINE

## 2018-01-03 PROCEDURE — 90686 IIV4 VACC NO PRSV 0.5 ML IM: CPT | Performed by: FAMILY MEDICINE

## 2018-01-03 PROCEDURE — 82306 VITAMIN D 25 HYDROXY: CPT | Performed by: FAMILY MEDICINE

## 2018-01-03 RX ORDER — SIMVASTATIN 10 MG
TABLET ORAL
Qty: 90 TABLET | Refills: 3 | Status: SHIPPED | OUTPATIENT
Start: 2018-01-03 | End: 2018-12-29

## 2018-01-03 RX ORDER — LISINOPRIL/HYDROCHLOROTHIAZIDE 10-12.5 MG
1 TABLET ORAL DAILY
Qty: 90 TABLET | Refills: 3 | Status: SHIPPED | OUTPATIENT
Start: 2018-01-03 | End: 2018-12-28

## 2018-01-03 RX ORDER — SIMVASTATIN 10 MG
TABLET ORAL
Qty: 90 TABLET | Refills: 0 | Status: SHIPPED | OUTPATIENT
Start: 2018-01-03 | End: 2018-01-03

## 2018-01-03 RX ORDER — LISINOPRIL/HYDROCHLOROTHIAZIDE 10-12.5 MG
1 TABLET ORAL DAILY
Qty: 30 TABLET | Refills: 0 | Status: CANCELLED | OUTPATIENT
Start: 2018-01-03

## 2018-01-03 ASSESSMENT — PAIN SCALES - GENERAL: PAINLEVEL: NO PAIN (0)

## 2018-01-03 NOTE — MR AVS SNAPSHOT
After Visit Summary   1/3/2018    Camelia Alvarez    MRN: [id number]           Patient Information     Date Of Birth          1960        Visit Information        Provider Department      1/3/2018 3:30 PM Marta Calvin MD [facility]        Today's Diagnoses     Routine general medical examination at a health care facility    -  1    Hypertension goal BP (blood pressure) < 140/90        Need for prophylactic vaccination and inoculation against influenza        Screening for cervical cancer        Breast cancer screening        Numbness of toes        Hyperlipidemia LDL goal <160        Vitamin D deficiency        Left foot pain          Care Instructions    Get the flu shot today  Get the left foot xray    Schedule for podiatry consult  Schedule for mammogram    Start taking CALTRATE-D twice daily    Preventive Health Recommendations  Female Ages 50 - 64    Yearly exam: See your health care provider every year in order to  o Review health changes.   o Discuss preventive care.    o Review your medicines if your doctor has prescribed any.      Get a Pap test every three years (unless you have an abnormal result and your provider advises testing more often).    If you get Pap tests with HPV test, you only need to test every 5 years, unless you have an abnormal result.     You do not need a Pap test if your uterus was removed (hysterectomy) and you have not had cancer.    You should be tested each year for STDs (sexually transmitted diseases) if you're at risk.     Have a mammogram every 1 to 2 years.    Have a colonoscopy at age 50, or have a yearly FIT test (stool test). These exams screen for colon cancer.      Have a cholesterol test every 5 years, or more often if advised.    Have a diabetes test (fasting glucose) every three years. If you are at risk for diabetes, you should have this test more often.     If you are at risk for osteoporosis (brittle bone disease), think  about having a bone density scan (DEXA).    Shots: Get a flu shot each year. Get a tetanus shot every 10 years.    Nutrition:     Eat at least 5 servings of fruits and vegetables each day.    Eat whole-grain bread, whole-wheat pasta and brown rice instead of white grains and rice.    Talk to your provider about Calcium and Vitamin D.     Lifestyle    Exercise at least 150 minutes a week (30 minutes a day, 5 days a week). This will help you control your weight and prevent disease.    Limit alcohol to one drink per day.    No smoking.     Wear sunscreen to prevent skin cancer.     See your dentist every six months for an exam and cleaning.    See your eye doctor every 1 to 2 years.            Follow-ups after your visit        Additional Services     PODIATRY/FOOT & ANKLE SURGERY REFERRAL       Your provider has referred you to: [facility]: [facility]: [address] [place] [address] Patient Clinic Line: [phone]     Please be aware that coverage of these services is subject to the terms and limitations of your health insurance plan.  Call member services at your health plan with any benefit or coverage questions.      Please bring the following to your appointment:  >>   Any x-rays, CTs or MRIs which have been performed.  Contact the facility where they were done to arrange for  prior to your scheduled appointment.    >>   List of current medications   >>   This referral request   >>   Any documents/labs given to you for this referral                  Future tests that were ordered for you today     Open Future Orders        Priority Expected Expires Ordered    XR Foot Left G/E 3 Views Routine 1/3/2018 1/3/2019 1/3/2018    MA Screening Digital Bilateral Routine  1/3/2019 1/3/2018            Who to contact     If you have questions or need follow up information about today's clinic visit or your schedule please contact [facility] directly at [phone].  Normal or non-critical  "lab and imaging results will be communicated to you by MyChart, letter or phone within 4 business days after the clinic has received the results. If you do not hear from us within 7 days, please contact the clinic through [facility] or phone. If you have a critical or abnormal lab result, we will notify you by phone as soon as possible.  Submit refill requests through [facility] or call your pharmacy and they will forward the refill request to us. Please allow 3 business days for your refill to be completed.          Additional Information About Your Visit        [employer] Information     [facility] gives you secure access to your electronic health record. If you see a primary care provider, you can also send messages to your care team and make appointments. If you have questions, please call your primary care clinic.  If you do not have a primary care provider, please call [phone] and they will assist you.      [facility] is an electronic gateway that provides easy, online access to your medical records. With [facility], you can request a clinic appointment, read your test results, renew a prescription or communicate with your care team.     To access your existing account, please contact your [facility] or call [phone] for assistance.        Care EveryWhere ID     This is your Care EveryWhere ID. This could be used by other organizations to access your [place] medical records  [id number]        Your Vitals Were     Pulse Temperature Height Last Period Pulse Oximetry BMI (Body Mass Index)    71 97.1  F (36.2  C) (Oral) 5' 11.25\" (1.81 m) 09/01/2014 (Approximate) 99% 25.69 kg/m2       Blood Pressure from Last 3 Encounters:   01/03/18 126/74   05/24/17 126/70   11/18/16 128/76    Weight from Last 3 Encounters:   01/03/18 185 lb 8 oz (84.1 kg)   05/24/17 185 lb 6.4 oz (84.1 kg)   11/18/16 187 lb 8 oz (85 kg)              We Performed the Following     FLU VAC, SPLIT VIRUS IM > 3 YO " (QUADRIVALENT) [22590]     HPV High Risk Types DNA Cervical     Pap imaged thin layer screen with HPV - recommended age 30 - 65 years (select HPV order below)     PODIATRY/FOOT & ANKLE SURGERY REFERRAL     Vaccine Administration, Initial [95819]     Vitamin D Deficiency          Today's Medication Changes          These changes are accurate as of: 1/3/18  4:05 PM.  If you have any questions, ask your nurse or doctor.               These medicines have changed or have updated prescriptions.        Dose/Directions    lisinopril-hydrochlorothiazide 10-12.5 MG per tablet   Commonly known as:  PRINZIDE/ZESTORETIC   This may have changed:  See the new instructions.   Used for:  Hypertension goal BP (blood pressure) < 140/90   Changed by:  Marta Calvin MD        Dose:  1 tablet   Take 1 tablet by mouth daily   Quantity:  90 tablet   Refills:  3       simvastatin 10 MG tablet   Commonly known as:  ZOCOR   This may have changed:  See the new instructions.   Used for:  Hyperlipidemia LDL goal <160   Changed by:  Marta Calvin MD        TAKE ONE TABLET BY MOUTH AT BEDTIME   Quantity:  90 tablet   Refills:  3            Where to get your medicines      These medications were sent to [facility] - [place], [place] - [address]  [address], [facility] [address]     Phone:  [phone]     lisinopril-hydrochlorothiazide 10-12.5 MG per tablet    simvastatin 10 MG tablet                Primary Care Provider Office Phone # Fax #    Marta Calvin [phone]       [address]  [facility] [address]        Equal Access to Services     [facility] AH: Hadii aad ku hadasho Soomaali, waaxda luqadaha, qaybta kaalmada adeegyada, waxay kalliein hayalejandro funes. So [facility] [phone].    ATENCIÓN: Si habla español, tiene a robertson disposición servicios gratuitos de asistencia lingüística. Llame al [phone].    We comply with applicable federal civil rights laws and [place]  laws. We do not discriminate on the basis of race, color, national origin, age, disability, sex, sexual orientation, or gender identity.            Thank you!     Thank you for choosing [facility]  for your care. Our goal is always to provide you with excellent care. Hearing back from our patients is one way we can continue to improve our services. Please take a few minutes to complete the written survey that you may receive in the mail after your visit with us. Thank you!             Your Updated Medication List - Protect others around you: Learn how to safely use, store and throw away your medicines at www.disposemymeds.org.          This list is accurate as of: 1/3/18  4:05 PM.  Always use your most recent med list.                   Brand Name Dispense Instructions for use Diagnosis    DiphenhydrAMINE HCl (Sleep) 25 MG Caps      Take 1-2 tablets by mouth nightly as needed        fluticasone 50 MCG/ACT spray    FLONASE    16 g    Spray 2 sprays into both nostrils daily    Nasal congestion, Chronic rhinitis       lisinopril-hydrochlorothiazide 10-12.5 MG per tablet    PRINZIDE/ZESTORETIC    90 tablet    Take 1 tablet by mouth daily    Hypertension goal BP (blood pressure) < 140/90       MULTIVITAMIN TABS   OR     0    1 tablet daily        omeprazole 20 MG CR capsule    priLOSEC    90 capsule    TAKE 1 CAPSULE (20 MG) BY MOUTH  30-60 MINUTES BEFORE A MEAL.    Gastroesophageal reflux disease without esophagitis       simvastatin 10 MG tablet    ZOCOR    90 tablet    TAKE ONE TABLET BY MOUTH AT BEDTIME    Hyperlipidemia LDL goal <160       VITAMIN D-3 PO      Take 2,000 Units by mouth daily Patient unsure of dose

## 2018-01-03 NOTE — PROGRESS NOTES
SUBJECTIVE:   CC: Camelia Alvarez is an 57 year old woman who presents for preventive health visit.     Healthy Habits:    Do you get at least three servings of calcium containing foods daily (dairy, green leafy vegetables, etc.)? yes    Amount of exercise or daily activities, outside of work: no current routine    Problems taking medications regularly No    Medication side effects: No    Have you had an eye exam in the past two years? yes    Do you see a dentist twice per year? yes    Do you have sleep apnea, excessive snoring or daytime drowsiness?no        Joint Pain  Patient is here for physical and with concerns of having numbness intermittently on bilateral toes, worse on the left  toes associated with moderate pain over the ball of left foot with no concerns for injury of fall for the past 3-4 months.    Onset: 4months    Description:   Location: as above  Character: Stabbing    Intensity: moderate    Progression of Symptoms: intermittent    Accompanying Signs & Symptoms:  Other symptoms: numbness    History:   Previous similar pain: no       Precipitating factors:   Trauma or overuse: no     Alleviating factors:  Improved by: nothing    Therapies Tried and outcome: none tried      Hyperlipidemia Follow-Up      Rate your low fat/cholesterol diet?: not monitoring fat    Taking statin?  Yes, no muscle aches from statin    Other lipid medications/supplements?:  none    Hypertension Follow-up      Outpatient blood pressures are not being checked.    Low Salt Diet: no added salt            Today's PHQ-2 Score:   PHQ-2 ( 1999 Pfizer) 1/3/2018 11/18/2016   Q1: Little interest or pleasure in doing things 0 0   Q2: Feeling down, depressed or hopeless 0 0   PHQ-2 Score 0 0   Q1: Little interest or pleasure in doing things - -   Q2: Feeling down, depressed or hopeless - -   PHQ-2 Score - -         Abuse: Current or Past(Physical, Sexual or Emotional)- No  Do you feel safe in your environment - Yes  Social History    Substance Use Topics     Smoking status: Never Smoker     Smokeless tobacco: Never Used     Alcohol use No     If you drink alcohol do you typically have >3 drinks per day or >7 drinks per week? No                     Reviewed orders with patient.  Reviewed health maintenance and updated orders accordingly - Yes  Labs reviewed in EPIC  BP Readings from Last 3 Encounters:   18 126/74   17 126/70   16 128/76    Wt Readings from Last 3 Encounters:   18 185 lb 8 oz (84.1 kg)   17 185 lb 6.4 oz (84.1 kg)   16 187 lb 8 oz (85 kg)                  Patient Active Problem List   Diagnosis     Allergic rhinitis due to dust     Hx gestational diabetes     Hypertension goal BP (blood pressure) < 140/90     Hyperlipidemia LDL goal <160     ASCUS favor benign     Nail dystrophy-right toe     Urge incontinence of urine     Left shoulder pain     Cervicalgia     Past Surgical History:   Procedure Laterality Date     C APPENDECTOMY       COLONOSCOPY      NL, repeat in 10 years     COLONOSCOPY  2015    min[facility] gastro     HC CURETTAGE, POSTPARTUM       TONSILLECTOMY         Social History   Substance Use Topics     Smoking status: Never Smoker     Smokeless tobacco: Never Used     Alcohol use No     Family History   Problem Relation Age of Onset     DIABETES Paternal Grandmother      type 2     Hypertension Mother      Hypertension Father      OSTEOPOROSIS Maternal Grandmother      Arthritis Maternal Grandmother      C.A.D. Father       of MI at age 81         Current Outpatient Prescriptions   Medication Sig Dispense Refill     lisinopril-hydrochlorothiazide (PRINZIDE/ZESTORETIC) 10-12.5 MG per tablet Take 1 tablet by mouth daily 90 tablet 3     simvastatin (ZOCOR) 10 MG tablet TAKE ONE TABLET BY MOUTH AT BEDTIME 90 tablet 3     omeprazole (PRILOSEC) 20 MG CR capsule TAKE 1 CAPSULE (20 MG) BY MOUTH  30-60 MINUTES BEFORE A MEAL. 90 capsule 1     fluticasone (FLONASE) 50 MCG/ACT nasal  spray Spray 2 sprays into both nostrils daily 16 g 2     Cholecalciferol (VITAMIN D-3 PO) Take 2,000 Units by mouth daily Patient unsure of dose       DiphenhydrAMINE HCl, Sleep, 25 MG CAPS Take 1-2 tablets by mouth nightly as needed        MULTIVITAMIN TABS   OR 1 tablet daily 0 0     [DISCONTINUED] simvastatin (ZOCOR) 10 MG tablet TAKE ONE TABLET BY MOUTH AT BEDTIME  90 tablet 0     [DISCONTINUED] lisinopril-hydrochlorothiazide (PRINZIDE/ZESTORETIC) 10-12.5 MG per tablet TAKE ONE TABLET BY MOUTH ONE TIME DAILY 30 tablet 0     Allergies   Allergen Reactions     Penicillins Other (See Comments)     Reaction as a child-unsure of type of reaction     Recent Labs   Lab Test  18   0751  16   0754  11/13/15   0821   LDL  84  95  79   HDL  56  54  67   TRIG  79  102  91   ALT  31  42  36   CR  0.82  0.86  0.86   GFRESTIMATED  71  69  68   GFRESTBLACK  86  83  83   POTASSIUM  3.8  3.9  4.3   TSH  1.67  2.02   --               Patient over age 50, mutual decision to screen reflected in health maintenance.      Pertinent mammograms are reviewed under the imaging tab.  History of abnormal Pap smear: NO - age 30- 65 PAP every 3 years recommended    Reviewed and updated as needed this visit by clinical staffTobacco  Allergies  Meds  Med Hx  Surg Hx  Fam Hx  Soc Hx        Reviewed and updated as needed this visit by Provider          Past Medical History:   Diagnosis Date     Allergic rhinitis due to dust      ASCUS favor benign 14    Neg HPV, Co-test 3 yrs per guidelines     GERD (gastroesophageal reflux disease)      Gestational diabetes      Hyperlipidemia       Past Surgical History:   Procedure Laterality Date     C APPENDECTOMY       COLONOSCOPY      NL, repeat in 10 years     COLONOSCOPY  2015    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     HC CURETTAGE, POSTPARTUM       TONSILLECTOMY       Obstetric History       T2      L2     SAB1   TAB0   Ectopic0   Multiple0   Live Births2       # Outcome Date GA  "Lbr Uriah/2nd Weight Sex Delivery Anes PTL Lv   3 Term 91 40w0d  9 lb 4 oz (4.196 kg) M    MARCELLO      Name: Benito Quan Term 88 39w0d  8 lb 14 oz (4.026 kg) F    MARCELLO      Name: Alia Garcia SAB                     ROS:  C: NEGATIVE for fever, chills, change in weight  I: NEGATIVE for worrisome rashes, moles or lesions  E: NEGATIVE for vision changes or irritation  ENT: NEGATIVE for ear, mouth and throat problems  R: NEGATIVE for significant cough or SOB  B: NEGATIVE for masses, tenderness or discharge  CV: NEGATIVE for chest pain, palpitations or peripheral edema  CV: Hx HTN  GI: NEGATIVE for nausea, abdominal pain, heartburn, or change in bowel habits  : NEGATIVE for unusual urinary or vaginal symptoms. No vaginal bleeding.  MUSCULOSKELETAL:as above  NEURO: as above  E: NEGATIVE for temperature intolerance, skin/hair changes  H: NEGATIVE for bleeding problems  P: NEGATIVE for changes in mood or affect     OBJECTIVE:   /74 (BP Location: Right arm, Patient Position: Sitting, Cuff Size: Adult Regular)  Pulse 71  Temp 97.1  F (36.2  C) (Oral)  Ht 5' 11.25\" (1.81 m)  Wt 185 lb 8 oz (84.1 kg)  LMP 2014 (Approximate)  SpO2 99%  BMI 25.69 kg/m2  EXAM:  GENERAL APPEARANCE: healthy, alert and no distress  EYES: Eyes grossly normal to inspection, PERRL and conjunctivae and sclerae normal  HENT: ear canals and TM's normal, nose and mouth without ulcers or lesions, oropharynx clear and oral mucous membranes moist  NECK: no adenopathy, no asymmetry, masses, or scars and thyroid normal to palpation  RESP: lungs clear to auscultation - no rales, rhonchi or wheezes  BREAST: normal without masses, tenderness or nipple discharge and no palpable axillary masses or adenopathy  CV: regular rate and rhythm, normal S1 S2, no S3 or S4, no murmur, click or rub, no peripheral edema and peripheral pulses strong  ABDOMEN: soft, nontender, no hepatosplenomegaly, no masses and bowel sounds normal   (female): " normal female external genitalia, normal urethral meatus, vaginal mucosal atrophy noted, normal cervix, adnexae, and uterus without masses or abnormal discharge  MS: Bilateral feet-bunions, left worse than the right  Right foot-normal exam  Left foot-moderate to severe tenderness over the metatarsal pads between the third and fourth toes  SKIN: no suspicious lesions or rashes  NEURO: Normal strength and tone, sensory exam grossly normal, mentation intact and speech normal  DIABETIC FOOT EXAM: normal DP and PT pulses, no trophic changes or ulcerative lesions and normal sensory exam  PSYCH: mentation appears normal and affect normal/bright    ASSESSMENT/PLAN:   1. Encounter for general adult medical examination with abnormal findings  Discussed on regular exercises, daily calcium intake, healthy eating, self breast exams monthly and routine dental checks  - Pap imaged thin layer screen with HPV - recommended age 30 - 65 years (select HPV order below)  - HPV High Risk Types DNA Cervical  - MA Screening Digital Bilateral; Future    2. Hypertension goal BP (blood pressure) < 140/90  BP Readings from Last 6 Encounters:   01/03/18 126/74   05/24/17 126/70   11/18/16 128/76   03/15/16 122/68   03/02/16 118/64   02/22/16 118/74       Blood pressure is at goal, continue with Prinzide, Will follow low salt diet, weight loss and regular exercises.  Recheck in 1 year or sooner if needed.    - lisinopril-hydrochlorothiazide (PRINZIDE/ZESTORETIC) 10-12.5 MG per tablet; Take 1 tablet by mouth daily  Dispense: 90 tablet; Refill: 3    3. Need for prophylactic vaccination and inoculation against influenza    - FLU VAC, SPLIT VIRUS IM > 3 YO (QUADRIVALENT) [16420]  - Vaccine Administration, Initial [11072]    4. Screening for cervical cancer    - Pap imaged thin layer screen with HPV - recommended age 30 - 65 years (select HPV order below)  - HPV High Risk Types DNA Cervical    5. Breast cancer screening    - MA Screening Digital  Bilateral; Future    6. Numbness of toes    Lab Results   Component Value Date    TSH 1.67 01/03/2018     Lab Results   Component Value Date    WBC 5.5 01/03/2018     Lab Results   Component Value Date    RBC 4.13 01/03/2018     Lab Results   Component Value Date    HGB 12.8 01/03/2018     Lab Results   Component Value Date    HCT 39.7 01/03/2018     No components found for: MCT  Lab Results   Component Value Date    MCV 96 01/03/2018     Lab Results   Component Value Date    MCH 31.0 01/03/2018     Lab Results   Component Value Date    MCHC 32.2 01/03/2018     Lab Results   Component Value Date    RDW 12.2 01/03/2018     Lab Results   Component Value Date     01/03/2018     Last Basic Metabolic Panel:  Lab Results   Component Value Date     01/03/2018      Lab Results   Component Value Date    POTASSIUM 3.8 01/03/2018     Lab Results   Component Value Date    CHLORIDE 105 01/03/2018     Lab Results   Component Value Date    MARYLOU 9.0 01/03/2018     Lab Results   Component Value Date    CO2 29 01/03/2018     Lab Results   Component Value Date    BUN 22 01/03/2018     Lab Results   Component Value Date    CR 0.82 01/03/2018     Lab Results   Component Value Date    GLC 97 01/03/2018     .  Reviewed normal thyroid functions, normal hemoglobin and blood counts, normal CMP from today.  Left foot numbness along with left foot pain could be from Hayes's neuroma  Recommended patient to consult podiatry for further evaluation  - PODIATRY/FOOT & ANKLE SURGERY REFERRAL  - XR Foot Left G/E 3 Views; Future    7. Hyperlipidemia LDL goal <160  LDL Cholesterol Calculated   Date Value Ref Range Status   01/03/2018 84 <100 mg/dL Final     Comment:     Desirable:       <100 mg/dl   ]  LDL is at goal, continue with Zocor 10 mg daily, healthy eating, regular exercises, recheck lipids in 1 year    - simvastatin (ZOCOR) 10 MG tablet; TAKE ONE TABLET BY MOUTH AT BEDTIME  Dispense: 90 tablet; Refill: 3    8. Vitamin D  deficiency    Patient is currently taking vitamin D 2000 international units daily  Patient states that she was told by her  to take 10,000 international units daily after reading on the Internet.  Recommended patient that she gets a vitamin D checked today, if it is normal, will continue with current supplements.  Also patient has questions whether she can take beta glucan as a daily supplement as recommended by her .  Informed the patient that this provider does not have enough information on beta glucan.   I would rather not want to take extra supplements than what is needed .  Recommended patient to start taking over-the-counter Caltrate D twice daily for prophylaxis of osteoporosis.  Patient verbalised understanding and is agreeable to the plan.      - Vitamin D Deficiency    9. Left foot pain  ddx-Hayes's neuroma/stress fracture given the clinical exam today  We will get x-rays for further evaluation,   consult podiatry  Will f/u on results and call with recommendations.  Patient verbalised understanding and is agreeable to the plan.    - PODIATRY/FOOT & ANKLE SURGERY REFERRAL  - XR Foot Left G/E 3 Views; Future    COUNSELING:   Reviewed preventive health counseling, as reflected in patient instructions  Special attention given to:        Regular exercise       Healthy diet/nutrition       Vision screening       Immunizations    Vaccinated for: Influenza           Osteoporosis Prevention/Bone Health       Colon cancer screening       Consider Hep C screening for patients born between 1945 and 1965       (Seema)menopause management       The 10-year ASCVD risk score (Carloscaio HER Jr, et al., 2013) is: 2.2%    Values used to calculate the score:      Age: 57 years      Sex: Female      Is Non- : No      Diabetic: No      Tobacco smoker: No      Systolic Blood Pressure: 126 mmHg      Is BP treated: Yes      HDL Cholesterol: 56 mg/dL      Total Cholesterol: 156 mg/dL       Advance  "Care Planning           reports that she has never smoked. She has never used smokeless tobacco.    Estimated body mass index is 25.69 kg/(m^2) as calculated from the following:    Height as of this encounter: 5' 11.25\" (1.81 m).    Weight as of this encounter: 185 lb 8 oz (84.1 kg).   Weight management plan: Discussed healthy diet and exercise guidelines and patient will follow up in 12 months in clinic to re-evaluate.    Counseling Resources:  ATP IV Guidelines  Pooled Cohorts Equation Calculator  Breast Cancer Risk Calculator  FRAX Risk Assessment  ICSI Preventive Guidelines  Dietary Guidelines for Americans, 2010  USDA's MyPlate  ASA Prophylaxis  Lung CA Screening    Marta Calvin MD  [facility]  Chart documentation done in part with Dragon Voice recognition Software. Although reviewed after completion, some word and grammatical error may remain.    Injectable Influenza Immunization Documentation    1.  Is the person to be vaccinated sick today?   No    2. Does the person to be vaccinated have an allergy to a component   of the vaccine?   No  Egg Allergy Algorithm Link    3. Has the person to be vaccinated ever had a serious reaction   to influenza vaccine in the past?   No    4. Has the person to be vaccinated ever had Guillain-Barré syndrome?   No    Form completed by Ivone Oquendo CMA           "

## 2018-01-03 NOTE — NURSING NOTE
"Chief Complaint   Patient presents with     Physical       Initial /74 (BP Location: Right arm, Patient Position: Sitting, Cuff Size: Adult Regular)  Pulse 71  Temp 97.1  F (36.2  C) (Oral)  Ht 5' 11.25\" (1.81 m)  Wt 185 lb 8 oz (84.1 kg)  LMP 09/01/2014 (Approximate)  SpO2 99%  BMI 25.69 kg/m2 Estimated body mass index is 25.69 kg/(m^2) as calculated from the following:    Height as of this encounter: 5' 11.25\" (1.81 m).    Weight as of this encounter: 185 lb 8 oz (84.1 kg).  BP completed using cuff size: regular  Ivone Oquendo, CMA    "

## 2018-01-03 NOTE — PATIENT INSTRUCTIONS
Get the flu shot today  Get the left foot xray    Schedule for podiatry consult  Schedule for mammogram    Start taking CALTRATE-D twice daily    Preventive Health Recommendations  Female Ages 50 - 64    Yearly exam: See your health care provider every year in order to  o Review health changes.   o Discuss preventive care.    o Review your medicines if your doctor has prescribed any.      Get a Pap test every three years (unless you have an abnormal result and your provider advises testing more often).    If you get Pap tests with HPV test, you only need to test every 5 years, unless you have an abnormal result.     You do not need a Pap test if your uterus was removed (hysterectomy) and you have not had cancer.    You should be tested each year for STDs (sexually transmitted diseases) if you're at risk.     Have a mammogram every 1 to 2 years.    Have a colonoscopy at age 50, or have a yearly FIT test (stool test). These exams screen for colon cancer.      Have a cholesterol test every 5 years, or more often if advised.    Have a diabetes test (fasting glucose) every three years. If you are at risk for diabetes, you should have this test more often.     If you are at risk for osteoporosis (brittle bone disease), think about having a bone density scan (DEXA).    Shots: Get a flu shot each year. Get a tetanus shot every 10 years.    Nutrition:     Eat at least 5 servings of fruits and vegetables each day.    Eat whole-grain bread, whole-wheat pasta and brown rice instead of white grains and rice.    Talk to your provider about Calcium and Vitamin D.     Lifestyle    Exercise at least 150 minutes a week (30 minutes a day, 5 days a week). This will help you control your weight and prevent disease.    Limit alcohol to one drink per day.    No smoking.     Wear sunscreen to prevent skin cancer.     See your dentist every six months for an exam and cleaning.    See your eye doctor every 1 to 2 years.

## 2018-01-03 NOTE — TELEPHONE ENCOUNTER
simvastatin (ZOCOR) 10 MG tablet  Last Written Prescription Date: 11/18/2016  Last Fill Quantity: 90, # refills: 3  Last Office Visit with [facility], [facility] or [facility] prescribing provider: 5/24/2017  Next 5 appointments (look out 90 days)     Jan 03, 2018  3:30 PM CST   PHYSICAL with Marta Calvin MD   [facility]    [address] [place] [id number]   [phone]                   Lab Results   Component Value Date    CHOL 169 11/18/2016     Lab Results   Component Value Date    HDL 54 11/18/2016     Lab Results   Component Value Date    LDL 95 11/18/2016     Lab Results   Component Value Date    TRIG 102 11/18/2016     Lab Results   Component Value Date    CHOLHDLRATIO 2.4 11/13/2015     Patient scheduled for fasting labs and OV today. Rhonda Sainz RN

## 2018-01-04 LAB — DEPRECATED CALCIDIOL+CALCIFEROL SERPL-MC: 49 UG/L (ref 20–75)

## 2018-01-05 LAB
COPATH REPORT: NORMAL
PAP: NORMAL

## 2018-01-05 NOTE — PROGRESS NOTES
Nico Denise,  Your vitamin D test results are within normal range, please continue with vitamin D 2000 international units daily.   Let me know if you have any questions. Take care.  Marta Calvin MD

## 2018-01-09 LAB
FINAL DIAGNOSIS: NORMAL
HPV HR 12 DNA CVX QL NAA+PROBE: NEGATIVE
HPV16 DNA SPEC QL NAA+PROBE: NEGATIVE
HPV18 DNA SPEC QL NAA+PROBE: NEGATIVE
SPECIMEN DESCRIPTION: NORMAL

## 2018-01-10 PROBLEM — M54.2 CERVICALGIA: Status: RESOLVED | Noted: 2017-01-18 | Resolved: 2018-01-10

## 2018-01-10 PROBLEM — M25.512 LEFT SHOULDER PAIN: Status: RESOLVED | Noted: 2017-01-18 | Resolved: 2018-01-10

## 2018-01-10 NOTE — PROGRESS NOTES
Subjective:  HPI                    Objective:  System    Physical Exam    General     ROS    Assessment/Plan:    DISCHARGE REPORT    Progress reporting period is from 1-18-17 to 2-1-17.     SUBJECTIVE  Subjective: Pt says she is not having stabbing pain. Twinging up to 2/10 at worst. Has noticed improved ROM and ability to use arm since starting therapy.  Says she has been performing exercises 1-2x/day and feels a 40% improvement since starting therapy   Current Pain level:  (2/10 with use)   Initial Pain level: 10/10   Changes in function: Yes, see goal flow sheet for change in function   Adverse reactions: None;   ,     The subjective and objective information are from the last SOAP note on this patient.    OBJECTIVE  Objective: ROM L shoulder flexion end range pain-min loss,  degrees actively with pain at end range. 70 degrees rotation B with 60 degrees ext. L shoulder IR to L2. Still has painful arc with ABD. Neck extension 60 deg before exercises, 70 deg afterwards.  Rotation ROM improved minimally with neck exercises      ASSESSMENT/PLAN  Updated problem list and treatment plan: Diagnosis 1:   L shoulder pain    STG/LTGs have been met or progress has been made towards goals:  Yes (See Goal flow sheet completed today.)  Assessment of Progress: The patient has not returned to therapy. Current status is unknown.  Self Management Plans:  Patient has been instructed in a home treatment program.  Patient  has been instructed in self management of symptoms.  Camelia continues to require the following intervention to meet STG and LTG's: PT intervention is no longer required to meet STG/LTG.  The patient failed to complete scheduled/ordered appointments so current information is unknown.  We will discharge this patient from PT.    Recommendations:  This patient will be discharged from therapy and continue their home treatment program.    Please refer to the daily flowsheet for treatment today, total treatment time  and time spent performing 1:1 timed codes.

## 2018-01-31 ENCOUNTER — RADIANT APPOINTMENT (OUTPATIENT)
Dept: MAMMOGRAPHY | Facility: CLINIC | Age: 58
End: 2018-01-31
Attending: FAMILY MEDICINE
Payer: COMMERCIAL

## 2018-01-31 ENCOUNTER — OFFICE VISIT (OUTPATIENT)
Dept: PODIATRY | Facility: CLINIC | Age: 58
End: 2018-01-31
Attending: FAMILY MEDICINE
Payer: COMMERCIAL

## 2018-01-31 VITALS
HEIGHT: 72 IN | SYSTOLIC BLOOD PRESSURE: 102 MMHG | HEART RATE: 62 BPM | WEIGHT: 190.4 LBS | OXYGEN SATURATION: 99 % | DIASTOLIC BLOOD PRESSURE: 70 MMHG | BODY MASS INDEX: 25.79 KG/M2

## 2018-01-31 DIAGNOSIS — M20.42 HAMMER TOES OF BOTH FEET: ICD-10-CM

## 2018-01-31 DIAGNOSIS — M20.41 HAMMER TOES OF BOTH FEET: ICD-10-CM

## 2018-01-31 DIAGNOSIS — Z12.39 BREAST CANCER SCREENING: ICD-10-CM

## 2018-01-31 DIAGNOSIS — G60.9 IDIOPATHIC PERIPHERAL NEUROPATHY: ICD-10-CM

## 2018-01-31 DIAGNOSIS — M77.8 CAPSULITIS OF LEFT FOOT: Primary | ICD-10-CM

## 2018-01-31 DIAGNOSIS — Z00.01 ENCOUNTER FOR GENERAL ADULT MEDICAL EXAMINATION WITH ABNORMAL FINDINGS: ICD-10-CM

## 2018-01-31 PROCEDURE — 99203 OFFICE O/P NEW LOW 30 MIN: CPT | Performed by: PODIATRIST

## 2018-01-31 PROCEDURE — 77067 SCR MAMMO BI INCL CAD: CPT | Performed by: STUDENT IN AN ORGANIZED HEALTH CARE EDUCATION/TRAINING PROGRAM

## 2018-01-31 NOTE — PATIENT INSTRUCTIONS
Thanks for coming today.  [facility]  [address] [place] [address]    To schedule future appointments in Ortho Clinic, you may call [phone].    To schedule ordered imaging by your Provider: Call [facility] at [phone]    [employer] available online at:   [email or web address]    Please call if any further questions or concerns [phone] and ask for the Orthopedic Department. Clinic hours 8 am to 5 pm.    Return to clinic if symptoms worsen.

## 2018-01-31 NOTE — NURSING NOTE
"Camelia Alvarez's goals for this visit include: Numbness in toes on left foot  She requests these members of her care team be copied on today's visit information: yes    PCP: Marta Calvin    Referring Provider:  Marta Calvin MD  [address] [place] [address]    Chief Complaint   Patient presents with     Consult     Musculoskeletal Problem     Numbness in toes on both feet, left worse       Initial /70  Pulse 62  Ht 1.847 m (6' 0.7\")  Wt 86.4 kg (190 lb 6.4 oz)  LMP 09/01/2014 (Approximate)  SpO2 99%  BMI 25.33 kg/m2 Estimated body mass index is 25.33 kg/(m^2) as calculated from the following:    Height as of this encounter: 1.847 m (6' 0.7\").    Weight as of this encounter: 86.4 kg (190 lb 6.4 oz).  Medication Reconciliation: complete    "

## 2018-01-31 NOTE — LETTER
1/31/2018         RE: Camelia Alvarez  [address] [address] [place] [id number]        Dear Colleague,    Thank you for referring your patient, Camelia Alvarez, to the [facility]. Please see a copy of my visit note below.    Past Medical History:   Diagnosis Date     Allergic rhinitis due to dust      ASCUS favor benign 11/12/14    Neg HPV, Co-test 3 yrs per guidelines     GERD (gastroesophageal reflux disease)      Gestational diabetes      Hyperlipidemia      Patient Active Problem List   Diagnosis     Allergic rhinitis due to dust     Hx gestational diabetes     Hypertension goal BP (blood pressure) < 140/90     Hyperlipidemia LDL goal <160     Nail dystrophy-right toe     Urge incontinence of urine     Past Surgical History:   Procedure Laterality Date     C APPENDECTOMY  1999     COLONOSCOPY  2011    NL, repeat in 10 years     COLONOSCOPY  03/2015    minn gastro     HC CURETTAGE, POSTPARTUM       TONSILLECTOMY       Social History     Social History     Marital status:      Spouse name: N/A     Number of children: 2     Years of education: 12 years     Occupational History     office work       [employer]     Social History Main Topics     Smoking status: Never Smoker     Smokeless tobacco: Never Used     Alcohol use No     Drug use: No     Sexual activity: Yes     Other Topics Concern      Service No     Blood Transfusions No     Caffeine Concern Yes     and sugar feels like headach might happen, scalp feels strange     Occupational Exposure No     Hobby Hazards No     Sleep Concern Yes     trouble falling asleep or sleep and wake up     Stress Concern No     Weight Concern Yes     Special Diet Yes     eliminate sugar      Back Care No     Exercise Yes     5 days walking     Bike Helmet No     Seat Belt Yes     Self-Exams Yes     monthly     Social History Narrative     Family History   Problem Relation Age of Onset     DIABETES Paternal Grandmother       type 2     Hypertension Mother      Hypertension Father      OSTEOPOROSIS Maternal Grandmother      Arthritis Maternal Grandmother      C.A.D. Father       of MI at age 81     Lab Results   Component Value Date    WBC 5.5 2018     Lab Results   Component Value Date    RBC 4.13 2018     Lab Results   Component Value Date    HGB 12.8 2018     Lab Results   Component Value Date    HCT 39.7 2018     No components found for: MCT  Lab Results   Component Value Date    MCV 96 2018     Lab Results   Component Value Date    MCH 31.0 2018     Lab Results   Component Value Date    MCHC 32.2 2018     Lab Results   Component Value Date    RDW 12.2 2018     Lab Results   Component Value Date     2018     Last Basic Metabolic Panel:  Lab Results   Component Value Date     2018      Lab Results   Component Value Date    POTASSIUM 3.8 2018     Lab Results   Component Value Date    CHLORIDE 105 2018     Lab Results   Component Value Date    MARYLOU 9.0 2018     Lab Results   Component Value Date    CO2 29 2018     Lab Results   Component Value Date    BUN 22 2018     Lab Results   Component Value Date    CR 0.82 2018     Lab Results   Component Value Date    GLC 97 2018     SUBJECTIVE FINDINGS: A 58-year-old female presenting from [facility], for numbness in her toes.  She relates that she gets numbness in all of her toes.  The left 2nd toe is worse than the rest.  She relates that if she massages her left MPJs she gets sharp pain on them.  She relates that the right foot has kind of a full feeling on it.  She relates it has been going on for a few months.  She feels it coincided with her discontinuing lactose milk.  She relates no injuries, no specific relieving or aggravating factors, no treatment.  She relates no back disease in the past.  She relates she has had shoulder problems with nerve impingement in  her upper back.  She is not sure if she has a family history of diabetes.  She feels that she has been tested for diabetes from her primary physician.      OBJECTIVE FINDINGS: DP and PT are 2/4 bilaterally.  She has dorsally contracted digit 2 through 5 bilaterally.  She has laterally deviated hallux with dorsal medial 1st MPJ prominence bilaterally.  She has plantar prominent 2 through 4 MPJs bilaterally with some slight heparin karyotype tissue buildup.  There is some anterior fat pad displacement bilaterally.  She has pain on palpation of the 2 through 4 MPJs but the plantar 2nd MPJ is worse than the 3 and 4 on the left.  There is a palpable click in the 3rd interspace on the left foot.  There is no pain with pinch and squeeze test in the interspaces bilaterally.  There are no gross tendon voids bilaterally.  Muscle strength is intact bilaterally.  She has a mild decreased ankle joint dorsiflexion bilaterally.  There is no erythema, no drainage, no odor, no calor bilaterally.  Sharp/dull is intact with 5.07 [employer]-Kale monofilament bilaterally, although she relates she did not feel it quite as well on the left 1st MPJ as everywhere else.      IMAGING:  X-rays were reviewed today from 01/03/2018.  Joint and cortical margins are intact.  There is no fracture.  She has bunion deformity present and contracted digits present noted.      ASSESSMENT AND PLAN: Capsulitis, MPJs left foot.  She has got hallux valgus with bunion and hammertoes present.  She is getting some peripheral neuritis in her toes.  Diagnosis and treatment options discussed with her.  I advised her on stretching.  Spenco or Ped Pillow over-the-counter insoles advised and use discussed with her.  Metatarsal pads dispensed and use discussed with her.  I am going to give her a referral to Orthopedics to look at her lower back to rule out any radiculopathy symptoms.  I also gave her a referral to Neurology to rule out any neurological problems.  Her  foot deformities do explain her full feeling and her capsulitis pain and neuroma symptoms on the left foot, but do not fully explain the numbness across all of her toes bilaterally.  She will return to clinic and see me in about 8 weeks.           Again, thank you for allowing me to participate in the care of your patient.        Sincerely,        Waldo Horn DPM

## 2018-01-31 NOTE — PROGRESS NOTES
Past Medical History:   Diagnosis Date     Allergic rhinitis due to dust      ASCUS favor benign 14    Neg HPV, Co-test 3 yrs per guidelines     GERD (gastroesophageal reflux disease)      Gestational diabetes      Hyperlipidemia      Patient Active Problem List   Diagnosis     Allergic rhinitis due to dust     Hx gestational diabetes     Hypertension goal BP (blood pressure) < 140/90     Hyperlipidemia LDL goal <160     Nail dystrophy-right toe     Urge incontinence of urine     Past Surgical History:   Procedure Laterality Date     C APPENDECTOMY       COLONOSCOPY      NL, repeat in 10 years     COLONOSCOPY  2015    minn gastro     HC CURETTAGE, POSTPARTUM       TONSILLECTOMY       Social History     Social History     Marital status:      Spouse name: N/A     Number of children: 2     Years of education: 12 years     Occupational History     office work       [employer]     Social History Main Topics     Smoking status: Never Smoker     Smokeless tobacco: Never Used     Alcohol use No     Drug use: No     Sexual activity: Yes     Other Topics Concern      Service No     Blood Transfusions No     Caffeine Concern Yes     and sugar feels like headach might happen, scalp feels strange     Occupational Exposure No     Hobby Hazards No     Sleep Concern Yes     trouble falling asleep or sleep and wake up     Stress Concern No     Weight Concern Yes     Special Diet Yes     eliminate sugar      Back Care No     Exercise Yes     5 days walking     Bike Helmet No     Seat Belt Yes     Self-Exams Yes     monthly     Social History Narrative     Family History   Problem Relation Age of Onset     DIABETES Paternal Grandmother      type 2     Hypertension Mother      Hypertension Father      OSTEOPOROSIS Maternal Grandmother      Arthritis Maternal Grandmother      C.A.D. Father       of MI at age 81     Lab Results   Component Value Date    WBC 5.5 2018     Lab Results    Component Value Date    RBC 4.13 01/03/2018     Lab Results   Component Value Date    HGB 12.8 01/03/2018     Lab Results   Component Value Date    HCT 39.7 01/03/2018     No components found for: MCT  Lab Results   Component Value Date    MCV 96 01/03/2018     Lab Results   Component Value Date    MCH 31.0 01/03/2018     Lab Results   Component Value Date    MCHC 32.2 01/03/2018     Lab Results   Component Value Date    RDW 12.2 01/03/2018     Lab Results   Component Value Date     01/03/2018     Last Basic Metabolic Panel:  Lab Results   Component Value Date     01/03/2018      Lab Results   Component Value Date    POTASSIUM 3.8 01/03/2018     Lab Results   Component Value Date    CHLORIDE 105 01/03/2018     Lab Results   Component Value Date    MARYLOU 9.0 01/03/2018     Lab Results   Component Value Date    CO2 29 01/03/2018     Lab Results   Component Value Date    BUN 22 01/03/2018     Lab Results   Component Value Date    CR 0.82 01/03/2018     Lab Results   Component Value Date    GLC 97 01/03/2018     SUBJECTIVE FINDINGS: A 58-year-old female presenting from [facility], for numbness in her toes.  She relates that she gets numbness in all of her toes.  The left 2nd toe is worse than the rest.  She relates that if she massages her left MPJs she gets sharp pain on them.  She relates that the right foot has kind of a full feeling on it.  She relates it has been going on for a few months.  She feels it coincided with her discontinuing lactose milk.  She relates no injuries, no specific relieving or aggravating factors, no treatment.  She relates no back disease in the past.  She relates she has had shoulder problems with nerve impingement in her upper back.  She is not sure if she has a family history of diabetes.  She feels that she has been tested for diabetes from her primary physician.      OBJECTIVE FINDINGS: DP and PT are 2/4 bilaterally.  She has dorsally contracted digit 2 through 5  bilaterally.  She has laterally deviated hallux with dorsal medial 1st MPJ prominence bilaterally.  She has plantar prominent 2 through 4 MPJs bilaterally with some slight heparin karyotype tissue buildup.  There is some anterior fat pad displacement bilaterally.  She has pain on palpation of the 2 through 4 MPJs but the plantar 2nd MPJ is worse than the 3 and 4 on the left.  There is a palpable click in the 3rd interspace on the left foot.  There is no pain with pinch and squeeze test in the interspaces bilaterally.  There are no gross tendon voids bilaterally.  Muscle strength is intact bilaterally.  She has a mild decreased ankle joint dorsiflexion bilaterally.  There is no erythema, no drainage, no odor, no calor bilaterally.  Sharp/dull is intact with 5.07 [place]-Kale monofilament bilaterally, although she relates she did not feel it quite as well on the left 1st MPJ as everywhere else.      IMAGING:  X-rays were reviewed today from 01/03/2018.  Joint and cortical margins are intact.  There is no fracture.  She has bunion deformity present and contracted digits present noted.      ASSESSMENT AND PLAN: Capsulitis, MPJs left foot.  She has got hallux valgus with bunion and hammertoes present.  She is getting some peripheral neuritis in her toes.  Diagnosis and treatment options discussed with her.  I advised her on stretching.  Spenco or Ped Pillow over-the-counter insoles advised and use discussed with her.  Metatarsal pads dispensed and use discussed with her.  I am going to give her a referral to Orthopedics to look at her lower back to rule out any radiculopathy symptoms.  I also gave her a referral to Neurology to rule out any neurological problems.  Her foot deformities do explain her full feeling and her capsulitis pain and neuroma symptoms on the left foot, but do not fully explain the numbness across all of her toes bilaterally.  She will return to clinic and see me in about 8 weeks.

## 2018-04-08 DIAGNOSIS — K21.9 GASTROESOPHAGEAL REFLUX DISEASE WITHOUT ESOPHAGITIS: ICD-10-CM

## 2018-04-09 ENCOUNTER — TELEPHONE (OUTPATIENT)
Dept: PEDIATRICS | Facility: CLINIC | Age: 58
End: 2018-04-09

## 2018-04-09 DIAGNOSIS — K21.9 GASTROESOPHAGEAL REFLUX DISEASE WITHOUT ESOPHAGITIS: Primary | ICD-10-CM

## 2018-04-09 NOTE — TELEPHONE ENCOUNTER
[facility] pharmacy calling to clarify directions on omeprazole (PRILOSEC) 20 MG CR capsule [[id number]] (Order [id number]) . Please advise [phone].

## 2018-04-09 NOTE — TELEPHONE ENCOUNTER
omeprazole (PRILOSEC) 20 MG CR capsule 90 capsule 1 10/9/2017  No   Sig: TAKE 1 CAPSULE (20 MG) BY MOUTH  30-60 MINUTES BEFORE A MEAL.     Last OV with Dr. Calvin: 1/3/2018    No future apts.     PPI Protocol Passed4/8 2:36 PM   Not on Clopidogrel (unless Pantoprazole ordered)    No diagnosis of osteoporosis on record    Recent (12 mo) or future (30 days) visit within the authorizing provider's specialty    Patient is age 18 or older    No active pregnacy on record    No positive pregnancy test in past 12 months     Refilled per [facility] protocol.    Rhonda Sainz RN

## 2018-04-09 NOTE — TELEPHONE ENCOUNTER
Called [facility].  They needed to have the times per day on the sig.      Since this was not indicated, will have [facility] cancel the prescription, and new one is loaded for provider to review and sign.    Olivia Paul RN, [facility]

## 2018-05-02 ENCOUNTER — FCC EXTENDED DOCUMENTATION (OUTPATIENT)
Dept: PSYCHOLOGY | Facility: CLINIC | Age: 58
End: 2018-05-02

## 2018-05-02 NOTE — PROGRESS NOTES
Discharge Summary  Multiple Sessions    Client Name: Camelia Alvarez MRN#: [id number] YOB: 1960      Intake / Discharge Date: 6/21/17 - 7/19/17  2 sessions    DSM5 Diagnoses:   Diagnoses: Adjustment Disorders  309.24 (F43.22) With anxiety  Psychosocial & Contextual Factors: spouse is controlling and maybe Narcissistic Personality Disorder.  WHODAS: 14 at intake          Presenting Concern:  Marital discord      Reason for Discharge:  Client is satisfied with progress      Disposition at Time of Last Encounter:   Comments:   Camelia sampson was insightful and coping better. There was a limit to change she could expect from spouse.     Risk Management:   Client denies a history of suicidal ideation, suicide attempts, self-injurious behavior, homicidal ideation, homicidal behavior and and other safety concerns  A safety and risk management plan has not been developed at this time, however client was given the after-hours number / 911 should there be a change in any of these risk factors.    Referred To:  The plan was for Camelia sampson to use Yes, and communication. Seek support outside of marriage for self worth and validation. Prioritize her value of turning heads to regulate her eating. Understand spouse if narcissistic. She can return to therapy as needed.        Fatoumata Bloom, SAMUEL   5/2/2018

## 2018-12-28 DIAGNOSIS — I10 HYPERTENSION GOAL BP (BLOOD PRESSURE) < 140/90: ICD-10-CM

## 2018-12-28 DIAGNOSIS — K21.9 GASTROESOPHAGEAL REFLUX DISEASE WITHOUT ESOPHAGITIS: ICD-10-CM

## 2018-12-30 DIAGNOSIS — R09.81 NASAL CONGESTION: ICD-10-CM

## 2018-12-30 DIAGNOSIS — J31.0 CHRONIC RHINITIS: ICD-10-CM

## 2018-12-30 NOTE — LETTER
January 2, 2019      Camelia Alvarez  [address] [address] [place] [phone]              Dear Camelia,    We recently received a refill request from your pharmacy requesting a refill of :     Pending Prescriptions:                       Disp   Refills    fluticasone (FLONASE) 50 MCG/ACT nasal sp*16 g   1            Sig: Inhale 2 sprays into both nostrils once daily      A review of your chart indicates that your last office visit was 1 year ago.  An appointment is required every year with your provider. We have authorized one time refill of your medication to allow time for you to schedule.     Please call the clinic at [phone], or log in to your [employer] account at [email or web address] to schedule your appointment within that time frame so there is no delay in next month's refill.    Thank you for taking an active role in your healthcare.    Sincerely,    Nyla Crane RN

## 2018-12-31 RX ORDER — LISINOPRIL/HYDROCHLOROTHIAZIDE 10-12.5 MG
1 TABLET ORAL DAILY
Qty: 30 TABLET | Refills: 0 | Status: SHIPPED | OUTPATIENT
Start: 2018-12-31

## 2018-12-31 NOTE — TELEPHONE ENCOUNTER
Patient is due to be seen next month. Ira refill given per protocol.     Philomena Muñoz RN   [facility]

## 2019-01-02 RX ORDER — FLUTICASONE PROPIONATE 50 MCG
SPRAY, SUSPENSION (ML) NASAL
Qty: 16 G | Refills: 1 | Status: SHIPPED | OUTPATIENT
Start: 2019-01-02

## 2020-02-16 ENCOUNTER — HEALTH MAINTENANCE LETTER (OUTPATIENT)
Age: 60
End: 2020-02-16

## 2020-11-29 ENCOUNTER — HEALTH MAINTENANCE LETTER (OUTPATIENT)
Age: 60
End: 2020-11-29

## 2021-04-10 ENCOUNTER — HEALTH MAINTENANCE LETTER (OUTPATIENT)
Age: 61
End: 2021-04-10

## 2021-09-19 ENCOUNTER — HEALTH MAINTENANCE LETTER (OUTPATIENT)
Age: 61
End: 2021-09-19

## 2022-05-01 ENCOUNTER — HEALTH MAINTENANCE LETTER (OUTPATIENT)
Age: 62
End: 2022-05-01

## 2022-11-21 ENCOUNTER — HEALTH MAINTENANCE LETTER (OUTPATIENT)
Age: 62
End: 2022-11-21

## 2023-06-02 ENCOUNTER — HEALTH MAINTENANCE LETTER (OUTPATIENT)
Age: 63
End: 2023-06-02